# Patient Record
Sex: FEMALE | Race: WHITE | URBAN - METROPOLITAN AREA
[De-identification: names, ages, dates, MRNs, and addresses within clinical notes are randomized per-mention and may not be internally consistent; named-entity substitution may affect disease eponyms.]

---

## 2023-12-04 ENCOUNTER — LAB (OUTPATIENT)
Age: 53
End: 2023-12-04
Payer: COMMERCIAL

## 2023-12-04 DIAGNOSIS — Z00.00 ROUTINE GENERAL MEDICAL EXAMINATION AT A HEALTH CARE FACILITY: ICD-10-CM

## 2023-12-04 PROCEDURE — 86480 TB TEST CELL IMMUN MEASURE: CPT | Performed by: PHYSICIAN ASSISTANT

## 2023-12-05 LAB
GAMMA INTERFERON BACKGROUND BLD IA-ACNC: 0.02 IU/ML
M TB IFN-G BLD-IMP: NEGATIVE
M TB IFN-G CD4+ BCKGRND COR BLD-ACNC: 0.21 IU/ML
M TB IFN-G CD4+ BCKGRND COR BLD-ACNC: 0.31 IU/ML
MITOGEN IGNF BCKGRD COR BLD-ACNC: 9.98 IU/ML

## 2024-01-04 ENCOUNTER — HOSPITAL ENCOUNTER (INPATIENT)
Facility: HOSPITAL | Age: 54
LOS: 18 days | Discharge: HOME | DRG: 750 | End: 2024-01-22
Attending: EMERGENCY MEDICINE | Admitting: PSYCHIATRY & NEUROLOGY
Payer: COMMERCIAL

## 2024-01-04 DIAGNOSIS — Z00.8 MEDICAL CLEARANCE FOR PSYCHIATRIC ADMISSION: ICD-10-CM

## 2024-01-04 DIAGNOSIS — F32.3 PSYCHOTIC DEPRESSION (HCC): Primary | ICD-10-CM

## 2024-01-04 DIAGNOSIS — T78.40XA ALLERGIES: ICD-10-CM

## 2024-01-04 LAB
ALBUMIN SERPL BCP-MCNC: 4.2 G/DL (ref 3.5–5)
ALP SERPL-CCNC: 67 U/L (ref 34–104)
ALT SERPL W P-5'-P-CCNC: 14 U/L (ref 7–52)
AMPHETAMINES SERPL QL SCN: NEGATIVE
ANION GAP SERPL CALCULATED.3IONS-SCNC: 7 MMOL/L
AST SERPL W P-5'-P-CCNC: 22 U/L (ref 13–39)
ATRIAL RATE: 88 BPM
ATRIAL RATE: 88 BPM
BACTERIA UR QL AUTO: NORMAL /HPF
BARBITURATES UR QL: NEGATIVE
BASOPHILS # BLD AUTO: 0.02 THOUSANDS/ÂΜL (ref 0–0.1)
BASOPHILS NFR BLD AUTO: 0 % (ref 0–1)
BENZODIAZ UR QL: NEGATIVE
BILIRUB SERPL-MCNC: 0.35 MG/DL (ref 0.2–1)
BILIRUB UR QL STRIP: NEGATIVE
BUN SERPL-MCNC: 11 MG/DL (ref 5–25)
CALCIUM SERPL-MCNC: 9.5 MG/DL (ref 8.4–10.2)
CHLORIDE SERPL-SCNC: 104 MMOL/L (ref 96–108)
CLARITY UR: ABNORMAL
CO2 SERPL-SCNC: 27 MMOL/L (ref 21–32)
COCAINE UR QL: NEGATIVE
COLOR UR: ABNORMAL
CREAT SERPL-MCNC: 0.74 MG/DL (ref 0.6–1.3)
EOSINOPHIL # BLD AUTO: 0.01 THOUSAND/ÂΜL (ref 0–0.61)
EOSINOPHIL NFR BLD AUTO: 0 % (ref 0–6)
ERYTHROCYTE [DISTWIDTH] IN BLOOD BY AUTOMATED COUNT: 14.3 % (ref 11.6–15.1)
ETHANOL EXG-MCNC: 0 MG/DL
GFR SERPL CREATININE-BSD FRML MDRD: 93 ML/MIN/1.73SQ M
GLUCOSE SERPL-MCNC: 98 MG/DL (ref 65–140)
GLUCOSE UR STRIP-MCNC: NEGATIVE MG/DL
HCT VFR BLD AUTO: 46.8 % (ref 34.8–46.1)
HGB BLD-MCNC: 14.8 G/DL (ref 11.5–15.4)
HGB UR QL STRIP.AUTO: NEGATIVE
IMM GRANULOCYTES # BLD AUTO: 0.02 THOUSAND/UL (ref 0–0.2)
IMM GRANULOCYTES NFR BLD AUTO: 0 % (ref 0–2)
KETONES UR STRIP-MCNC: NEGATIVE MG/DL
LEUKOCYTE ESTERASE UR QL STRIP: 25
LYMPHOCYTES # BLD AUTO: 0.92 THOUSANDS/ÂΜL (ref 0.6–4.47)
LYMPHOCYTES NFR BLD AUTO: 11 % (ref 14–44)
MCH RBC QN AUTO: 27.4 PG (ref 26.8–34.3)
MCHC RBC AUTO-ENTMCNC: 31.6 G/DL (ref 31.4–37.4)
MCV RBC AUTO: 87 FL (ref 82–98)
METHADONE UR QL: NEGATIVE
MONOCYTES # BLD AUTO: 0.52 THOUSAND/ÂΜL (ref 0.17–1.22)
MONOCYTES NFR BLD AUTO: 6 % (ref 4–12)
NEUTROPHILS # BLD AUTO: 6.83 THOUSANDS/ÂΜL (ref 1.85–7.62)
NEUTS SEG NFR BLD AUTO: 83 % (ref 43–75)
NITRITE UR QL STRIP: NEGATIVE
NON-SQ EPI CELLS URNS QL MICRO: NORMAL /HPF
NRBC BLD AUTO-RTO: 0 /100 WBCS
OPIATES UR QL SCN: POSITIVE
OXYCODONE+OXYMORPHONE UR QL SCN: NEGATIVE
P AXIS: 60 DEGREES
P AXIS: 61 DEGREES
PCP UR QL: NEGATIVE
PH UR STRIP.AUTO: 6 [PH]
PLATELET # BLD AUTO: 265 THOUSANDS/UL (ref 149–390)
PMV BLD AUTO: 9.9 FL (ref 8.9–12.7)
POTASSIUM SERPL-SCNC: 3.8 MMOL/L (ref 3.5–5.3)
PR INTERVAL: 136 MS
PR INTERVAL: 136 MS
PROT SERPL-MCNC: 7.2 G/DL (ref 6.4–8.4)
PROT UR STRIP-MCNC: ABNORMAL MG/DL
QRS AXIS: 33 DEGREES
QRS AXIS: 37 DEGREES
QRSD INTERVAL: 76 MS
QRSD INTERVAL: 76 MS
QT INTERVAL: 370 MS
QT INTERVAL: 378 MS
QTC INTERVAL: 447 MS
QTC INTERVAL: 457 MS
RBC # BLD AUTO: 5.4 MILLION/UL (ref 3.81–5.12)
RBC #/AREA URNS AUTO: NORMAL /HPF
SODIUM SERPL-SCNC: 138 MMOL/L (ref 135–147)
SP GR UR STRIP.AUTO: 1.02 (ref 1–1.04)
T WAVE AXIS: -2 DEGREES
T WAVE AXIS: 7 DEGREES
THC UR QL: NEGATIVE
TSH SERPL DL<=0.05 MIU/L-ACNC: 5.93 UIU/ML (ref 0.45–4.5)
UROBILINOGEN UA: NEGATIVE MG/DL
VENTRICULAR RATE: 88 BPM
VENTRICULAR RATE: 88 BPM
WBC # BLD AUTO: 8.32 THOUSAND/UL (ref 4.31–10.16)
WBC #/AREA URNS AUTO: NORMAL /HPF

## 2024-01-04 PROCEDURE — 80053 COMPREHEN METABOLIC PANEL: CPT | Performed by: EMERGENCY MEDICINE

## 2024-01-04 PROCEDURE — 80307 DRUG TEST PRSMV CHEM ANLYZR: CPT | Performed by: EMERGENCY MEDICINE

## 2024-01-04 PROCEDURE — 85025 COMPLETE CBC W/AUTO DIFF WBC: CPT | Performed by: EMERGENCY MEDICINE

## 2024-01-04 PROCEDURE — 99285 EMERGENCY DEPT VISIT HI MDM: CPT | Performed by: EMERGENCY MEDICINE

## 2024-01-04 PROCEDURE — 81001 URINALYSIS AUTO W/SCOPE: CPT | Performed by: EMERGENCY MEDICINE

## 2024-01-04 PROCEDURE — 82075 ASSAY OF BREATH ETHANOL: CPT | Performed by: EMERGENCY MEDICINE

## 2024-01-04 PROCEDURE — 36415 COLL VENOUS BLD VENIPUNCTURE: CPT | Performed by: EMERGENCY MEDICINE

## 2024-01-04 PROCEDURE — 96372 THER/PROPH/DIAG INJ SC/IM: CPT

## 2024-01-04 PROCEDURE — 84443 ASSAY THYROID STIM HORMONE: CPT | Performed by: EMERGENCY MEDICINE

## 2024-01-04 PROCEDURE — 93005 ELECTROCARDIOGRAM TRACING: CPT

## 2024-01-04 PROCEDURE — 99283 EMERGENCY DEPT VISIT LOW MDM: CPT

## 2024-01-04 RX ORDER — RISPERIDONE 1 MG/1
1 TABLET ORAL
Status: DISCONTINUED | OUTPATIENT
Start: 2024-01-04 | End: 2024-01-06

## 2024-01-04 RX ORDER — LORAZEPAM 2 MG/ML
2 INJECTION INTRAMUSCULAR ONCE
Status: COMPLETED | OUTPATIENT
Start: 2024-01-04 | End: 2024-01-04

## 2024-01-04 RX ORDER — HALOPERIDOL 5 MG/ML
5 INJECTION INTRAMUSCULAR ONCE
Status: COMPLETED | OUTPATIENT
Start: 2024-01-04 | End: 2024-01-04

## 2024-01-04 RX ORDER — HYDROXYZINE HYDROCHLORIDE 25 MG/1
25 TABLET, FILM COATED ORAL
Status: DISCONTINUED | OUTPATIENT
Start: 2024-01-04 | End: 2024-01-22 | Stop reason: HOSPADM

## 2024-01-04 RX ORDER — RISPERIDONE 2 MG/1
2 TABLET ORAL DAILY
Status: DISCONTINUED | OUTPATIENT
Start: 2024-01-04 | End: 2024-01-04

## 2024-01-04 RX ORDER — MAGNESIUM HYDROXIDE/ALUMINUM HYDROXICE/SIMETHICONE 120; 1200; 1200 MG/30ML; MG/30ML; MG/30ML
30 SUSPENSION ORAL EVERY 4 HOURS PRN
Status: DISCONTINUED | OUTPATIENT
Start: 2024-01-04 | End: 2024-01-22 | Stop reason: HOSPADM

## 2024-01-04 RX ORDER — HALOPERIDOL 5 MG/ML
5 INJECTION INTRAMUSCULAR
Status: DISCONTINUED | OUTPATIENT
Start: 2024-01-04 | End: 2024-01-06

## 2024-01-04 RX ORDER — LORAZEPAM 2 MG/ML
1 INJECTION INTRAMUSCULAR
Status: DISCONTINUED | OUTPATIENT
Start: 2024-01-04 | End: 2024-01-22 | Stop reason: HOSPADM

## 2024-01-04 RX ORDER — RISPERIDONE 0.5 MG/1
0.5 TABLET ORAL
Status: DISCONTINUED | OUTPATIENT
Start: 2024-01-04 | End: 2024-01-06

## 2024-01-04 RX ORDER — LANOLIN ALCOHOL/MO/W.PET/CERES
3 CREAM (GRAM) TOPICAL
Status: DISCONTINUED | OUTPATIENT
Start: 2024-01-04 | End: 2024-01-10

## 2024-01-04 RX ORDER — RISPERIDONE 0.25 MG/1
0.25 TABLET ORAL
Status: DISCONTINUED | OUTPATIENT
Start: 2024-01-04 | End: 2024-01-06

## 2024-01-04 RX ADMIN — LORAZEPAM 2 MG: 2 INJECTION INTRAMUSCULAR; INTRAVENOUS at 17:27

## 2024-01-04 RX ADMIN — LORAZEPAM 2 MG: 2 INJECTION INTRAMUSCULAR; INTRAVENOUS at 10:02

## 2024-01-04 RX ADMIN — HALOPERIDOL LACTATE 5 MG: 5 INJECTION, SOLUTION INTRAMUSCULAR at 10:01

## 2024-01-04 NOTE — ED NOTES
Pt refused risperdal multiple times despite education. Pt states that she was told to stop taking medication. Provider aware.      Yelitza Lyles RN  01/04/24 1125

## 2024-01-04 NOTE — ED NOTES
Patient's son Mega contacted the ED. His phone number is [phone]. Patient is currently medicated and sleeping. He is currently not identified as an emergency contact. Once awake she may wish to contact him.

## 2024-01-04 NOTE — ED NOTES
"The patient is a 52 y/o Austrian-speaking F who arrived in handcuffs with [employer] [employer] Officers Marisela and Reyes. According to EMR, the patient has a history of schizoaffective disorder, depressed type. Officers stated they were called to [employer]. They arrived to find the patient confused and disoriented. They reported she was there to  her sister's children, but her sister currently resides in [place] with her children. Patient is a poor historian. She repeatedly stated, \"I'm fine. I'm fine.\" \"I have to go now.\" She did not seem to understand why she was brought to the ED. She appeared fearful and paranoid, at one point placing herself behind a litter and in a corner. She was resistant to answering questions and, initially agreed to stay, but then got out her car keys, zipped her purse and coat and was about to exit the room. Staff attempted to have her remove her coat and to sit on the litter. Although she was never violent in any way, a struggle ensued due to the patient stiffening her arms and legs. She did require medication and restraints due to elopement risk and inability to cooperate with behavioral health safety protocol. At times, the patient stares blankly when asked questions. She occasionally gave brief responses. She denied being prescribed medication of any kind. EMR indicated that she was hospitalized at [facility] in September 2023 for psychosis. She had been prescribed Risperdal 2 mg BID, and it appears she had tried to get refills, but, per her request, her PCP discontinued it on 12/8, with instructions to discuss with her psychiatrist, Raudel Vu MD, and to restart if auditory hallucinations returned. Per EMR, the patient has had extensive psychiatric hospitalizations. She does seem to have had periods in which she actively sought refills for her medication; however, at this time she does not seem to recognize the need for " medication or treatment. Patient declined to sign in voluntarily and, in fact, does not seem to understand why she is here. ED Crisis initiated a 302 through [facility] Crisis Intervention, Ivone Ray. It was supported by Mitchel Flowers MD, the examining physician. The document was sent to [email or web address]. It was received and reviewed by Ivelisse Jones.

## 2024-01-04 NOTE — RESTRAINT FACE TO FACE
Restraint Face to Face   Olga Byrd 53 y.o. female MRN: [id number]  Unit/Bed#: ED 11 Encounter: [id number]      Physical Evaluation attempting to leave ed  Purpose for Restraints/ Seclusion High risk for self harm  Patient's reaction to the intervention cooperative  Patient's medical conditionstable  Patient's Behavioral condition unstable  Restraints to be Continued

## 2024-01-04 NOTE — ED PROVIDER NOTES
History  Chief Complaint   Patient presents with    Psychiatric Evaluation     Pt arrives in handcuffs via [employer] officers, found near school confused and disoriented, trying to  her sisters kids (sister lives in [place]).  Pt arrives to ED disoriented to place and time, uncooperative with care     53-year-old female presenting the emergency department with handcuffs via [employer] officer.  Patient was found near school confused disoriented trying to  sister's case.  But sister lives in [place].  Patient lives in the area, notes that stop taking antipsychotic on instructions of her PCP.  Denies hallucinations.  Staring into space.        None       History reviewed. No pertinent past medical history.    History reviewed. No pertinent surgical history.    History reviewed. No pertinent family history.  I have reviewed and agree with the history as documented.    E-Cigarette/Vaping     E-Cigarette/Vaping Substances     Social History     Tobacco Use    Smoking status: Unknown       Review of Systems   Constitutional:  Negative for chills and fever.   HENT:  Negative for ear pain and sore throat.    Eyes:  Negative for pain and visual disturbance.   Respiratory:  Negative for cough and shortness of breath.    Cardiovascular:  Negative for chest pain and palpitations.   Gastrointestinal:  Negative for abdominal pain and vomiting.   Genitourinary:  Negative for dysuria and hematuria.   Musculoskeletal:  Negative for arthralgias and back pain.   Skin:  Negative for color change and rash.   Neurological:  Negative for seizures and syncope.   All other systems reviewed and are negative.      Physical Exam  Physical Exam  Vitals and nursing note reviewed.   Constitutional:       General: She is not in acute distress.     Appearance: She is well-developed.   HENT:      Head: Normocephalic and atraumatic.      Nose: Nose normal.      Mouth/Throat:      Mouth: Mucous membranes are moist.   Eyes:      Conjunctiva/sclera:  Conjunctivae normal.   Cardiovascular:      Rate and Rhythm: Normal rate and regular rhythm.      Heart sounds: No murmur heard.  Pulmonary:      Effort: Pulmonary effort is normal. No respiratory distress.      Breath sounds: Normal breath sounds.   Abdominal:      Palpations: Abdomen is soft.      Tenderness: There is no abdominal tenderness.   Musculoskeletal:         General: No swelling.      Cervical back: Neck supple.   Skin:     General: Skin is warm and dry.      Capillary Refill: Capillary refill takes less than 2 seconds.   Neurological:      Mental Status: She is alert.   Psychiatric:         Mood and Affect: Mood normal.         Vital Signs  ED Triage Vitals [01/04/24 1056]   Temperature Pulse Respirations Blood Pressure SpO2   (!) 97 °F (36.1 °C) 81 18 155/96 95 %      Temp Source Heart Rate Source Patient Position - Orthostatic VS BP Location FiO2 (%)   Tympanic Monitor Lying Right arm --      Pain Score       No Pain           Vitals:    01/04/24 1056   BP: 155/96   Pulse: 81   Patient Position - Orthostatic VS: Lying         Visual Acuity      ED Medications  Medications   risperiDONE (RisperDAL) tablet 2 mg (2 mg Oral Not Given 1/4/24 1122)   haloperidol lactate (HALDOL) injection 5 mg (5 mg Intramuscular Given 1/4/24 1001)   LORazepam (ATIVAN) injection 2 mg (2 mg Intramuscular Given 1/4/24 1002)       Diagnostic Studies  Results Reviewed       Procedure Component Value Units Date/Time    TSH [[id number]]  (Abnormal) Collected: 01/04/24 1141    Lab Status: Final result Specimen: Blood from Arm, Right Updated: 01/04/24 1248     TSH 3RD GENERATON 5.929 uIU/mL     Comprehensive metabolic panel [[id number]] Collected: 01/04/24 1141    Lab Status: Final result Specimen: Blood from Arm, Right Updated: 01/04/24 1232     Sodium 138 mmol/L      Potassium 3.8 mmol/L      Chloride 104 mmol/L      CO2 27 mmol/L      ANION GAP 7 mmol/L      BUN 11 mg/dL      Creatinine 0.74 mg/dL      Glucose 98 mg/dL       Calcium 9.5 mg/dL      AST 22 U/L      ALT 14 U/L      Alkaline Phosphatase 67 U/L      Total Protein 7.2 g/dL      Albumin 4.2 g/dL      Total Bilirubin 0.35 mg/dL      eGFR 93 ml/min/1.73sq m     Narrative:      National Kidney Disease Foundation guidelines for Chronic Kidney Disease (CKD):     Stage 1 with normal or high GFR (GFR > 90 mL/min/1.73 square meters)    Stage 2 Mild CKD (GFR = 60-89 mL/min/1.73 square meters)    Stage 3A Moderate CKD (GFR = 45-59 mL/min/1.73 square meters)    Stage 3B Moderate CKD (GFR = 30-44 mL/min/1.73 square meters)    Stage 4 Severe CKD (GFR = 15-29 mL/min/1.73 square meters)    Stage 5 End Stage CKD (GFR <15 mL/min/1.73 square meters)  Note: GFR calculation is accurate only with a steady state creatinine    CBC and differential [[id number]]  (Abnormal) Collected: 01/04/24 1141    Lab Status: Final result Specimen: Blood from Arm, Right Updated: 01/04/24 1153     WBC 8.32 Thousand/uL      RBC 5.40 Million/uL      Hemoglobin 14.8 g/dL      Hematocrit 46.8 %      MCV 87 fL      MCH 27.4 pg      MCHC 31.6 g/dL      RDW 14.3 %      MPV 9.9 fL      Platelets 265 Thousands/uL      nRBC 0 /100 WBCs      Neutrophils Relative 83 %      Immat GRANS % 0 %      Lymphocytes Relative 11 %      Monocytes Relative 6 %      Eosinophils Relative 0 %      Basophils Relative 0 %      Neutrophils Absolute 6.83 Thousands/µL      Immature Grans Absolute 0.02 Thousand/uL      Lymphocytes Absolute 0.92 Thousands/µL      Monocytes Absolute 0.52 Thousand/µL      Eosinophils Absolute 0.01 Thousand/µL      Basophils Absolute 0.02 Thousands/µL     UA (URINE) with reflex to Scope [[id number]]     Lab Status: No result Specimen: Urine     Rapid drug screen, urine [[id number]]     Lab Status: No result Specimen: Urine     POCT alcohol breath test [[id number]]     Lab Status: No result                    No orders to display              Procedures  Procedures         ED Course                               SBIRT  20yo+      Flowsheet Row Most Recent Value   Initial Alcohol Screen: US AUDIT-C     1. How often do you have a drink containing alcohol? 0 Filed at: 01/04/2024 1144   2. How many drinks containing alcohol do you have on a typical day you are drinking?  0 Filed at: 01/04/2024 1144   3a. Male UNDER 65: How often do you have five or more drinks on one occasion? 0 Filed at: 01/04/2024 1144   3b. FEMALE Any Age, or MALE 65+: How often do you have 4 or more drinks on one occassion? 0 Filed at: 01/04/2024 1144   Audit-C Score 0 Filed at: 01/04/2024 1144   REJI: How many times in the past year have you...    Used an illegal drug or used a prescription medication for non-medical reasons? Never Filed at: 01/04/2024 1144                      Medical Decision Making  53-year-old female presenting emerged department with confusion, unable to explain her actions from earlier today.  302 petition by crisis worker, upheld.  Awaiting psychiatric evaluation and placement    Amount and/or Complexity of Data Reviewed  Labs: ordered.    Risk  Prescription drug management.  Decision regarding hospitalization.             Disposition  Final diagnoses:   Psychotic depression (HCC)     Time reflects when diagnosis was documented in both MDM as applicable and the Disposition within this note       Time User Action Codes Description Comment    1/4/2024  9:53 AM Mitchel Flowers Add [F32.3] Psychotic depression (HCC)           ED Disposition       ED Disposition   Transfer to Behavioral Health Condition   --    Date/Time   Thu Jan 4, 2024 1131    Comment   Olga Byrd should be transferred out to D and has been medically cleared.               Follow-up Information    None         Patient's Medications    No medications on file       No discharge procedures on file.    PDMP Review       None            ED Provider  Electronically Signed by             Mitchel Flowers MD  01/04/24 7479

## 2024-01-04 NOTE — ED NOTES
Remaining 2 restraints removed. Pt verbalized an understanding of acceptable behavior to remain out of restraints.      Yelitza Lyles RN  01/04/24 5561

## 2024-01-04 NOTE — ED NOTES
Pt sleeping with no s/s of distress observed. Continues on 1:1 for safety.      Yelitza Lyles RN  01/04/24 0443

## 2024-01-04 NOTE — ED NOTES
EKG attempted, pt became upset and requesting to leave. Uncooperative with redirection, security and additional staff at bedside.      Yelitza Lyles RN  01/04/24 8792

## 2024-01-04 NOTE — QUICK NOTE
This writer and resident physicians attempted to evaluate patient this p.m., however, patient appeared increasingly sedated secondary to use of p.r.n. medications, in contralateral restraints. Psychiatry to evaluate in the a.m.    Jordan Christopher Holter, DO

## 2024-01-05 PROBLEM — Z00.8 MEDICAL CLEARANCE FOR PSYCHIATRIC ADMISSION: Status: ACTIVE | Noted: 2024-01-05

## 2024-01-05 PROBLEM — R76.12 POSITIVE QUANTIFERON-TB GOLD TEST: Status: ACTIVE | Noted: 2024-01-05

## 2024-01-05 PROBLEM — F25.9 SCHIZOAFFECTIVE DISORDER (HCC): Status: ACTIVE | Noted: 2024-01-05

## 2024-01-05 LAB
25(OH)D3 SERPL-MCNC: 40.5 NG/ML (ref 30–100)
ANION GAP SERPL CALCULATED.3IONS-SCNC: 11 MMOL/L
BASOPHILS # BLD AUTO: 0.04 THOUSANDS/ÂΜL (ref 0–0.1)
BASOPHILS NFR BLD AUTO: 1 % (ref 0–1)
BUN SERPL-MCNC: 13 MG/DL (ref 5–25)
CALCIUM SERPL-MCNC: 9.7 MG/DL (ref 8.4–10.2)
CHLORIDE SERPL-SCNC: 101 MMOL/L (ref 96–108)
CHOLEST SERPL-MCNC: 198 MG/DL
CO2 SERPL-SCNC: 28 MMOL/L (ref 21–32)
CREAT SERPL-MCNC: 0.89 MG/DL (ref 0.6–1.3)
EOSINOPHIL # BLD AUTO: 0.05 THOUSAND/ÂΜL (ref 0–0.61)
EOSINOPHIL NFR BLD AUTO: 1 % (ref 0–6)
ERYTHROCYTE [DISTWIDTH] IN BLOOD BY AUTOMATED COUNT: 14.3 % (ref 11.6–15.1)
FOLATE SERPL-MCNC: >22.3 NG/ML
GFR SERPL CREATININE-BSD FRML MDRD: 74 ML/MIN/1.73SQ M
GLUCOSE P FAST SERPL-MCNC: 89 MG/DL (ref 65–99)
GLUCOSE SERPL-MCNC: 89 MG/DL (ref 65–140)
HCT VFR BLD AUTO: 48.8 % (ref 34.8–46.1)
HDLC SERPL-MCNC: 81 MG/DL
HGB BLD-MCNC: 15.7 G/DL (ref 11.5–15.4)
IMM GRANULOCYTES # BLD AUTO: 0.02 THOUSAND/UL (ref 0–0.2)
IMM GRANULOCYTES NFR BLD AUTO: 0 % (ref 0–2)
LDLC SERPL CALC-MCNC: 108 MG/DL (ref 0–100)
LYMPHOCYTES # BLD AUTO: 1.87 THOUSANDS/ÂΜL (ref 0.6–4.47)
LYMPHOCYTES NFR BLD AUTO: 24 % (ref 14–44)
MCH RBC QN AUTO: 28.1 PG (ref 26.8–34.3)
MCHC RBC AUTO-ENTMCNC: 32.2 G/DL (ref 31.4–37.4)
MCV RBC AUTO: 87 FL (ref 82–98)
MONOCYTES # BLD AUTO: 0.5 THOUSAND/ÂΜL (ref 0.17–1.22)
MONOCYTES NFR BLD AUTO: 6 % (ref 4–12)
NEUTROPHILS # BLD AUTO: 5.47 THOUSANDS/ÂΜL (ref 1.85–7.62)
NEUTS SEG NFR BLD AUTO: 68 % (ref 43–75)
NONHDLC SERPL-MCNC: 117 MG/DL
NRBC BLD AUTO-RTO: 0 /100 WBCS
PLATELET # BLD AUTO: 288 THOUSANDS/UL (ref 149–390)
PMV BLD AUTO: 10.3 FL (ref 8.9–12.7)
POTASSIUM SERPL-SCNC: 3.5 MMOL/L (ref 3.5–5.3)
RBC # BLD AUTO: 5.59 MILLION/UL (ref 3.81–5.12)
SODIUM SERPL-SCNC: 140 MMOL/L (ref 135–147)
TRIGL SERPL-MCNC: 47 MG/DL
VIT B12 SERPL-MCNC: 723 PG/ML (ref 180–914)
WBC # BLD AUTO: 7.95 THOUSAND/UL (ref 4.31–10.16)

## 2024-01-05 PROCEDURE — 82746 ASSAY OF FOLIC ACID SERUM: CPT | Performed by: PSYCHIATRY & NEUROLOGY

## 2024-01-05 PROCEDURE — 80048 BASIC METABOLIC PNL TOTAL CA: CPT | Performed by: PSYCHIATRY & NEUROLOGY

## 2024-01-05 PROCEDURE — 99223 1ST HOSP IP/OBS HIGH 75: CPT | Performed by: PSYCHIATRY & NEUROLOGY

## 2024-01-05 PROCEDURE — 82306 VITAMIN D 25 HYDROXY: CPT | Performed by: PSYCHIATRY & NEUROLOGY

## 2024-01-05 PROCEDURE — 80061 LIPID PANEL: CPT | Performed by: PSYCHIATRY & NEUROLOGY

## 2024-01-05 PROCEDURE — 82607 VITAMIN B-12: CPT | Performed by: PSYCHIATRY & NEUROLOGY

## 2024-01-05 PROCEDURE — 85025 COMPLETE CBC W/AUTO DIFF WBC: CPT | Performed by: PSYCHIATRY & NEUROLOGY

## 2024-01-05 RX ORDER — RISPERIDONE 1 MG/1
1 TABLET, ORALLY DISINTEGRATING ORAL 2 TIMES DAILY
Status: DISCONTINUED | OUTPATIENT
Start: 2024-01-05 | End: 2024-01-10

## 2024-01-05 RX ADMIN — LORAZEPAM 1 MG: 2 INJECTION INTRAMUSCULAR; INTRAVENOUS at 09:09

## 2024-01-05 RX ADMIN — HALOPERIDOL LACTATE 5 MG: 5 INJECTION, SOLUTION INTRAMUSCULAR at 09:09

## 2024-01-05 NOTE — PLAN OF CARE
Problem: Alteration in Thoughts and Perception  Goal: Treatment Goal: Gain control of psychotic behaviors/thinking, reduce/eliminate presenting symptoms and demonstrate improved reality functioning upon discharge  Outcome: Not Progressing  Goal: Verbalize thoughts and feelings  Description: Interventions:  - Promote a nonjudgmental and trusting relationship with the patient through active listening and therapeutic communication  - Assess patient's level of functioning, behavior and potential for risk  - Engage patient in 1 on 1 interactions  - Encourage patient to express fears, feelings, frustrations, and discuss symptoms    - [place] patient to reality, help patient recognize reality-based thinking   - Administer medications as ordered and assess for potential side effects  - Provide the patient education related to the signs and symptoms of the illness and desired effects of prescribed medications  Outcome: Not Progressing  Goal: Refrain from acting on delusional thinking/internal stimuli  Description: Interventions:  - Monitor patient closely, per order   - Utilize least restrictive measures   - Set reasonable limits, give positive feedback for acceptable   - Administer medications as ordered and monitor of potential side effects  Outcome: Not Progressing  Goal: Agree to be compliant with medication regime, as prescribed and report medication side effects  Description: Interventions:  - Offer appropriate PRN medication and supervise ingestion; conduct AIMS, as needed   Outcome: Not Progressing  Goal: Attend and participate in unit activities, including therapeutic, recreational, and educational groups  Description: Interventions:  -Encourage Visitation and family involvement in care  Outcome: Not Progressing  Goal: Recognize dysfunctional thoughts, communicate reality-based thoughts at the time of discharge  Description: Interventions:  - Provide medication and psycho-education to assist patient in compliance  and developing insight into his/her illness   Outcome: Not Progressing  Goal: Complete daily ADLs, including personal hygiene independently, as able  Description: Interventions:  - Observe, teach, and assist patient with ADLS  - Monitor and promote a balance of rest/activity, with adequate nutrition and elimination   Outcome: Not Progressing     Problem: Ineffective Coping  Goal: Cooperates with admission process  Description: Interventions:   - Complete admission process  Outcome: Not Progressing  Goal: Identifies ineffective coping skills  Outcome: Not Progressing  Goal: Identifies healthy coping skills  Outcome: Not Progressing  Goal: Demonstrates healthy coping skills  Outcome: Not Progressing  Goal: Participates in unit activities  Description: Interventions:  - Provide therapeutic environment   - Provide required programming   - Redirect inappropriate behaviors   Outcome: Not Progressing  Goal: Patient/Family participate in treatment and DC plans  Description: Interventions:  - Provide therapeutic environment  Outcome: Not Progressing  Goal: Patient/Family verbalizes awareness of resources  Outcome: Not Progressing  Goal: Understands least restrictive measures  Description: Interventions:  - Utilize least restrictive behavior  Outcome: Not Progressing  Goal: Free from restraint events  Description: - Utilize least restrictive measures   - Provide behavioral interventions   - Redirect inappropriate behaviors   Outcome: Not Progressing     Problem: Depression  Goal: Treatment Goal: Demonstrate behavioral control of depressive symptoms, verbalize feelings of improved mood/affect, and adopt new coping skills prior to discharge  Outcome: Not Progressing  Goal: Verbalize thoughts and feelings  Description: Interventions:  - Assess and re-assess patient's level of risk   - Engage patient in 1:1 interactions, daily, for a minimum of 15 minutes   - Encourage patient to express feelings, fears, frustrations, hopes    Outcome: Not Progressing  Goal: Refrain from harming self  Description: Interventions:  - Monitor patient closely, per order   - Supervise medication ingestion, monitor effects and side effects   Outcome: Not Progressing  Goal: Refrain from isolation  Description: Interventions:  - Develop a trusting relationship   - Encourage socialization   Outcome: Not Progressing  Goal: Refrain from self-neglect  Outcome: Not Progressing  Goal: Attend and participate in unit activities, including therapeutic, recreational, and educational groups  Description: Interventions:  - Provide therapeutic and educational activities daily, encourage attendance and participation, and document same in the medical record   Outcome: Not Progressing  Goal: Complete daily ADLs, including personal hygiene independently, as able  Description: Interventions:  - Observe, teach, and assist patient with ADLS  -  Monitor and promote a balance of rest/activity, with adequate nutrition and elimination   Outcome: Not Progressing     Problem: Anxiety  Goal: Anxiety is at manageable level  Description: Interventions:  - Assess and monitor patient's anxiety level.   - Monitor for signs and symptoms (heart palpitations, chest pain, shortness of breath, headaches, nausea, feeling jumpy, restlessness, irritable, apprehensive).   - Collaborate with interdisciplinary team and initiate plan and interventions as ordered.  - [place] patient to unit/surroundings  - Explain treatment plan  - Encourage participation in care  - Encourage verbalization of concerns/fears  - Identify coping mechanisms  - Assist in developing anxiety-reducing skills  - Administer/offer alternative therapies  - Limit or eliminate stimulants  Outcome: Not Progressing     Problem: Alteration in Orientation  Goal: Treatment Goal: Demonstrate a reduction of confusion and improved orientation to person, place, time and/or situation upon discharge, according to optimum baseline/ability  Outcome:  Not Progressing  Goal: Interact with staff daily  Description: Interventions:  - Assess and re-assess patient's level of orientation  - Engage patient in 1 on 1 interactions, daily, for a minimum of 15 minutes   - Establish rapport/trust with patient   Outcome: Not Progressing  Goal: Express concerns related to confused thinking related to:  Description: Interventions:  - Encourage patient to express feelings, fears, frustrations, hopes  - Assign consistent caregivers   - [place]/re-orient patient as needed  - Allow comfort items, as appropriate  - Provide visual cues, signs, etc.   Outcome: Not Progressing  Goal: Allow medical examinations, as recommended  Description: Interventions:  - Provide physical/neurological exams and/or referrals, per provider   Outcome: Not Progressing  Goal: Cooperate with recommended testing/procedures  Description: Interventions:  - Determine need for ancillary testing  - Observe for mental status changes  - Implement falls/precaution protocol   Outcome: Not Progressing  Goal: Attend and participate in unit activities, including therapeutic, recreational, and educational groups  Description: Interventions:  - Provide therapeutic and educational activities daily, encourage attendance and participation, and document same in the medical record   - Provide appropriate opportunities for reminiscence   - Provide a consistent daily routine   - Encourage family contact/visitation   Outcome: Not Progressing  Goal: Complete daily ADLs, including personal hygiene independently, as able  Description: Interventions:  - Observe, teach, and assist patient with ADLS  Outcome: Not Progressing

## 2024-01-05 NOTE — DISCHARGE INSTR - OTHER ORDERS
You are being discharged to: [address], [address], [place] [address]     If you are unable to deal with your distressed mood alone please contact [facility] Crisis # [phone], dial 911 or go to the nearest emergency center.     If you are experiencing a mental health emergency, you may call the [facility] Crisis Intervention Office 24 hours a day, 7 days per week at [phone].     In [facility], call [phone].     HOW TO GET SUBSTANCE ABUSE HELP:  If you or someone you know has a drug or alcohol problem, there is help:  [facility] Drug & Alcohol Abuse Services: [phone]  [facility] Alcohol Abuse [facility]: [phone]  An assessment is the first step.   In addition to those listed there are other programs available in the area but assessment is best to determine an appropriate level of care.  If you DO NOT have Medical Assistance (MA) or Private Insurance, an assessment can be scheduled at one of these providers:  [facility]  [address] [place] [address]  [phone]   [facility]  [address] [place] [address]  [phone]   [facility]  [address] [place] [address] [place] [address]  [phone]   [facility]  [address] [place] [address]  [phone]   [facility]  [address] [place] [address]  [phone]   [facility]  [address] [place] [address]  [phone]   [facility]  [address], [place] [address]  [phone]     If you HAVE Medical Assistance, an assessment can be scheduled at one of these providers:  [place] [facility]  [address] [facility] [place] [address]  [phone]   [facility]  [address] [place] [address]  [phone]   [facility]  [address], [place] [address]  [phone]  [address] [place] [address], [address], [place] [address]  [phone]   [address] [place] [address]  [phone]   [address] [place] [address]  [phone]   [facility])  [address] [place] [address]  [phone]   [facility]  [address] [place] [address]  [phone]   [facility]  [address] [place] [address]  [phone]   [facility]  [address] [place] [address]  [phone]   [facility]  [address], [place] [address]  [phone]     If you HAVE Private Insurance, an assessment can be scheduled at one of these providers.  Please contact these Providers to determine if they are in your network plan:  [facility]  [address], [place] [address]  [phone]   [address] [place] [address]  [phone]   [address] [place] [address]  [phone]   [facility])  [address] [place] [address]  [phone]   [facility]  [address] [place] [address]  [phone]   [facility]  [address], [place] [address]  [phone]     When you need someone to listen, the [facility] is available for 16 hours a day, 7 days a week, from the time of 7-10am and 2pm-2am.  It is not available from the hours of 2am-6am and 10am-2pm. A representative can be reached at [phone].

## 2024-01-05 NOTE — CASE MANAGEMENT
CM attempted to meet with pt to review 303. Pt currently in restraints and asleep due to PRN medications. CM completed 303 petition and faxed to [facility] with request for hearing on Tue 1/9.

## 2024-01-05 NOTE — TREATMENT TEAM
01/05/24 1428   Team Meeting   Meeting Type Tx Team Meeting   Initial Conference Date 01/05/24   Team Members Present   Team Members Present Physician;Nurse;   Physician Team Member Dr Matthews   Nursing Team Member Jose   Care Management Team Member Soni   Patient/Family Present   Patient Present Yes   Patient's Family Present No     CM reviewed treatment plan with pt in [place]. Pt refusing to sign. Pt reports she will not take medication or go to groups.

## 2024-01-05 NOTE — H&P
Psychiatric Evaluation - Behavioral Health   Olga Byrd 53 y.o. female MRN: [id number]  [facility] Encounter: [id number]    Assessment/Plan   Principal Problem:    Schizoaffective disorder (HCC)  Active Problems:    Medical clearance for psychiatric admission    Positive QuantiFERON-TB Gold test    Plan:  1.  Patient is admitted to [address] on a 302 involuntary commitment basis for safety and stabilization.  2.  Patient is started on psychiatric medication of Risperdal 1 mg twice daily to be titrated up to clinical effect.  3.  Group, milieu and supportive therapies.  4.  Medical team to follow and support patient's medical needs.  5.  Collateral information.  6.  Discharge planning                Current Facility-Administered Medications   Medication Dose Route Frequency Provider Last Rate    aluminum-magnesium hydroxide-simethicone  30 mL Oral Q4H PRN Kary Wilson MD      haloperidol lactate  5 mg Intramuscular Q4H PRN Max 4/day Kary Wilson MD      hydrOXYzine HCL  25 mg Oral Q6H PRN Max 4/day Kary Wilson MD      LORazepam  1 mg Intramuscular Q6H PRN Max 3/day Kary Wilson MD      melatonin  3 mg Oral HS Kary Wilson MD      nicotine polacrilex  4 mg Oral Q2H PRN Kary Wilson MD      risperiDONE  0.25 mg Oral Q4H PRN Max 6/day Kary Wilson MD      risperiDONE  0.5 mg Oral Q4H PRN Max 3/day Kary Wislon MD      risperiDONE  1 mg Oral Q2H PRN Max 3/day Kary Wilson MD       Risks, benefits and possible side effects of Medications:   Risks, benefits, and possible side effects of medications explained to patient and patient verbalizes understanding.         Chief Complaint: Psychosis with paranoia and delusions    History of Present Illness     Copy from ED note written by Gabbi Wellington, crisis worker on 1/4/2024    The patient is a 54 y/o Tristanian-speaking F who arrived in handcuffs with [employer] Officers Marisela and Reyes. According to  "EMR, the patient has a history of schizoaffective disorder, depressed type. Officers stated they were called to [employer]. They arrived to find the patient confused and disoriented. They reported she was there to  her sister's children, but her sister currently resides in [place] with her children. Patient is a poor historian. She repeatedly stated, \"I'm fine. I'm fine.\" \"I have to go now.\" She did not seem to understand why she was brought to the ED. She appeared fearful and paranoid, at one point placing herself behind a litter and in a corner. She was resistant to answering questions and, initially agreed to stay, but then got out her car keys, zipped her purse and coat and was about to exit the room. Staff attempted to have her remove her coat and to sit on the litter. Although she was never violent in any way, a struggle ensued due to the patient stiffening her arms and legs. She did require medication and restraints due to elopement risk and inability to cooperate with behavioral health safety protocol. At times, the patient stares blankly when asked questions. She occasionally gave brief responses. She denied being prescribed medication of any kind. EMR indicated that she was hospitalized at [facility] in September 2023 for psychosis. She had been prescribed Risperdal 2 mg BID, and it appears she had tried to get refills, but, per her request, her PCP discontinued it on 12/8, with instructions to discuss with her psychiatrist, Raudel Vu MD, and to restart if auditory hallucinations returned. Per EMR, the patient has had extensive psychiatric hospitalizations. She does seem to have had periods in which she actively sought refills for her medication; however, at this time she does not seem to recognize the need for medication or treatment. Patient declined to sign in voluntarily and, in fact, does not seem to understand why she is here. ED Crisis " initiated a 302 through [facility], Ivone Cory. It was supported by Mitchel Flowers MD, the examining physician. The document was sent to [email or web address]. It was received and reviewed by Ivelisse Jones.          On evaluation, patient is seen with Serbian-speaking .  Patient is a poor historian.  Patient is only giving limited information as she is focused on being discharged.  Patient is guarded, paranoid and easily irritable.  Patient is focused on being discharged.  Patient feels she needs to be discharged to be home with her 15-year-old daughter as she is afraid that she will be taken away from her if she is not there.  As above the patient was brought in by [employer] police as she was found outside toward her school insisting to  sister's children although her sister lives in [place] with her children.  Patient expresses paranoia.  The patient reports being brought to the hospital by fake police.  Patient also expresses that this is a scheme for her brother and sister to get money by making the patient look like she is unable to care for her children.  The patient was admitted to psych unit in [place] in September patient also states that this was done so that her siblings can get money trying to make the patient look like she is unable to care for her children.  Apparently this episode of psychosis was triggered by the patient fasting for Religion purposes.  Patient does admit to have been on a prescription of Risperdal in the past but says she was told she did not need to take it anymore.  Patient had been in restraints in the emergency room due to attempting to leave.  On the unit, the patient has been trying to elope at least twice off the unit.  Earlier this morning the patient, the patient had been banging on windows and then forced her way into the nurses station requiring staff to remove her she became combative and was placed into  4-point restraints requiring also Haldol and Ativan.  Patient at first was refusing to take medications but later stated that she was agreeable to it.                Psychiatric Review Of Systems: Patient is a poor historian  sleep: no  appetite changes: no  weight changes: no  energy/anergy: no  interest/pleasure/anhedonia: no  somatic symptoms: no  anxiety/panic: no patient was admitted  dom: no  guilty/hopeless: no  self injurious behavior/risky behavior: no    Historical Information     Past Psychiatric History:   Inpatient Treatment: To [facility] in [facility] in September 2023 for psychosis  Outpatient Treatment: [facility]  Past Suicide Attempts: Unknown due to patient factors  Past Violent Behavior: Patient can become combative  Past Psychiatric Medication Trials: Risperdal    Substance Abuse History:  E-Cigarette/Vaping    E-Cigarette Use Never User       E-Cigarette/Vaping Substances    Nicotine No     THC No     CBD No     Flavoring No     Other No     Unknown No        Social History       Tobacco History       Smoking Status  Never      Smokeless Tobacco Use  Never              Alcohol History       Alcohol Use Status  Yes              Drug Use       Drug Use Status  Yes Types  Opium              Sexual Activity       Sexually Active  Yes              Activities of Daily Living    Not Asked                 Additional Substance Use Detail       Questions Responses    Problems Due to Past Use of Alcohol? No    Problems Due to Past Use of Substances? No    Substance Use Assessment Substance use within the past 12 months    Alcohol Use Frequency Denies use in past 12 months    Cannabis frequency Never used    Comment:  Never used on 1/4/2024     Heroin Frequency Denies use in past 12 months    Cocaine frequency Never used    Comment:  Never used on 1/4/2024     Crack Cocaine Frequency Denies use in past 12 months    Methamphetamine Frequency Denies use in past 12 months    Narcotic  Frequency Denies use in past 12 months    Benzodiazepine Frequency Denies use in past 12 months    Amphetamine frequency Denies use in past 12 months    Barbituate Frequency Denies use use in past 12 months    Inhalant frequency Never used    Comment:  Never used on 1/4/2024     Hallucinogen frequency Never used    Comment:  Never used on 1/4/2024     Ecstasy frequency Never used    Comment:  Never used on 1/4/2024     Other drug frequency Never used    Comment:  Never used on 1/4/2024     Opiate frequency 1 or 2 times/week    Last reviewed by Macario Byrd RN on 1/4/2024          I have assessed this patient for substance use within the past 12 months    Family Psychiatric History:   Patient had denied    Social History:  Education:  12th grade  Marital history: single  Children: 2  Living arrangement, social support: Lives with daughter  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 No pertinent past medical history.    Medical Review Of Systems:  Pertinent items are noted in HPI.    Meds/Allergies   all current active meds have been reviewed  No Known Allergies    Objective   Vital signs in last 24 hours:  Temp:  [97 °F (36.1 °C)-97.4 °F (36.3 °C)] 97 °F (36.1 °C)  HR:  [] 83  Resp:  [16-18] 16  BP: (151-157)/(88-96) 157/95    No intake or output data in the 24 hours ending 01/05/24 0849    Mental Status Evaluation:  Appearance:  age appropriate, casually dressed, and disheveled   Behavior:  guarded, restless and fidgety, and limited cooperation   Speech:  Romanian-speaking   Mood:  irritable   Affect:  constricted   Language: Romanian-speaking   Thought Process:  goal directed and illogical   Thought Content:  Paranoia with delusions   Perceptual Disturbances: Patient denies but has had in the past per records   Risk Potential: Suicidal Ideations none  Homicidal Ideations none  Potential for Aggression Yes possible as had  become combative earlier   Sensorium:  person and place   Memory:  recent and remote memory: unable to assess due to lack of cooperation   Consciousness:  awake    Attention: attention span appeared shorter than expected for age   Intellect: within normal limits   Insight:  Poor   Judgment: Poor   Muscle Strength:  Muscle Tone: WNL   Gait/Station: normal gait/station   Motor Activity: no abnormal movements     Lab Results: I have personally reviewed all pertinent laboratory/tests results. Most Recent Labs:   Lab Results   Component Value Date    WBC 7.95 01/05/2024    RBC 5.59 (H) 01/05/2024    HGB 15.7 (H) 01/05/2024    HCT 48.8 (H) 01/05/2024     01/05/2024    RDW 14.3 01/05/2024    NEUTROABS 5.47 01/05/2024    SODIUM 140 01/05/2024    K 3.5 01/05/2024     01/05/2024    CO2 28 01/05/2024    BUN 13 01/05/2024    CREATININE 0.89 01/05/2024    GLUC 89 01/05/2024    GLUF 89 01/05/2024    CALCIUM 9.7 01/05/2024    AST 22 01/04/2024    ALT 14 01/04/2024    ALKPHOS 67 01/04/2024    TP 7.2 01/04/2024    ALB 4.2 01/04/2024    TBILI 0.35 01/04/2024    CHOLESTEROL 198 01/05/2024    HDL 81 01/05/2024    TRIG 47 01/05/2024    LDLCALC 108 (H) 01/05/2024    NONHDLC 117 01/05/2024    WOY6PHFBOSGN 5.929 (H) 01/04/2024        Code Status: Level 1 - Full Code      Patient Strengths/Assets: negotiates basic needs    Patient Barriers/Limitations: noncompliant with medication, patient is on an involuntary commitment    Counseling / Coordination of Care  Total floor / unit time spent today NA minutes. Greater than 50% of total time was spent with the patient and / or family counseling and / or coordination of care.       Length of stay : 5-7 midnights     Certification: Estimated length of stay: More than 2 midnights.   I certify that inpatient services are medically necessary for this patient for a duration of greater than 2 midnights. See H&P and MD Progress Notes for additional information about the patients course of  treatment.

## 2024-01-05 NOTE — TREATMENT TEAM
01/05/24 0914   Team Meeting   Meeting Type Daily Rounds   Initial Conference Date 01/05/24   Team Members Present   Team Members Present Physician;Nurse;;Occupational Therapist   Physician Team Member Sabine Mcgarry   Nursing Team Member Jose   Care Management Team Member Soni   Social Work Team Member Anabell   OT Team Member Sudheer   Patient/Family Present   Patient Present No   Patient's Family Present No     302 admission, BIB [place] police due to being found outside [employer] insisting to  sister's children although sister lives in [place] with her children. Pt disorganized, paranoid; placed in restraints in ED due to attempting to leave ED.     This AM pt banging on windows and then forced way into nurse's station; requires 6-7 staff to remove pt; currently in restraints, PRN Haldol IM and Ativan IM

## 2024-01-05 NOTE — CONSULTS
[facility]  Consult  Name: Olga Byrd 53 y.o. female I MRN: [id number]  Unit/Bed#: [facility] 607-02 I Date of Admission: 1/4/2024   Date of Service: 1/5/2024 I Hospital Day: 1    Inpatient consult for Medical Clearance for BH patient  Consult performed by: CHADD Gregorio  Consult ordered by: CHADD Riddle          Assessment/Plan   Medical clearance for psychiatric admission  Assessment & Plan  Vital signs stable afebrile blood pressure 150/90 and a recent outpatient care appointment her blood pressure was 117/77 I do believe that her blood pressure is elevated because she is off her psych meds she is agitated combative and now in 4 point restraints and seclusion  Patient medically cleared for admission  SL IM will sign off please call with any questions or concerns    Positive QuantiFERON-TB Gold test  Assessment & Plan  Per her recent outpatient office visit note that I reviewed patient's quant to [facility] gold was positive from 8/12/2023 from the [facility]             Counseling / Coordination of Care Time: 45 minutes.  Greater than 50% of total time spent on patient counseling and coordination of care.    Collaboration of Care: Were Recommendations Directly Discussed with Primary Treatment Team? - No     History of Present Illness:    Olga Byrd is a 53 y.o. female who is originally admitted to the psychiatry service due to disorientation and uncooperativeness. We are consulted for medical clearance for admission to Behavioral Health Unit and treatment of underlying psychiatric illness.      1/4/2024 patient presents to [place] ED in handcuffs with the [facility] police department patient was found near a school disoriented uncooperative and states stopped all antipsychotics.  Per outpatient office visit notes that were reviewed in epic patient had a recent prolonged stay in his psych facility details are unknown.  Patient does  "have a longstanding history greater than 1 year of schizoaffective disorder.  Past medical history per my retrospective chart review in epic she seems to have no significant past medical history.  She does have a QuantiFERON gold that was positive dated 8/12/2023 from the [facility] department.    Review of Systems:    Review of Systems  At the time of this encounter patient was in 4-point restraints in the seclusion room sedated and not cooperative.  Past Medical and Surgical History:     History reviewed. No pertinent past medical history.    History reviewed. No pertinent surgical history.    Meds/Allergies:    all medications and allergies reviewed    Allergies: No Known Allergies    Social History:     Marital Status: Unknown    Substance Use History:   Social History     Substance and Sexual Activity   Alcohol Use Yes     Social History     Tobacco Use   Smoking Status Never   Smokeless Tobacco Never     Social History     Substance and Sexual Activity   Drug Use Yes    Types: Opium       Family History:    non-contributory    Physical Exam:     Vitals:   Blood Pressure: 157/95 (01/05/24 0757)  Pulse: 83 (01/05/24 0757)  Temperature: (!) 97 °F (36.1 °C) (01/05/24 0757)  Temp Source: Temporal (01/05/24 0757)  Respirations: 16 (01/05/24 0757)  Height: 5' 2\" (157.5 cm) (01/04/24 2045)  Weight - Scale: 56.2 kg (123 lb 12.8 oz) (01/04/24 2045)  SpO2: 98 % (01/05/24 0757)    Physical Exam  Constitutional:       General: She is not in acute distress.     Appearance: Normal appearance. She is not ill-appearing.   HENT:      Head: Normocephalic and atraumatic.      Nose: Nose normal.      Mouth/Throat:      Mouth: Mucous membranes are moist.   Eyes:      Extraocular Movements: Extraocular movements intact.   Cardiovascular:      Rate and Rhythm: Normal rate and regular rhythm.      Heart sounds: Normal heart sounds.   Pulmonary:      Breath sounds: Normal breath sounds. No wheezing.   Abdominal:      General: " "Abdomen is flat. Bowel sounds are normal.      Palpations: Abdomen is soft.   Skin:     General: Skin is warm and dry.   Neurological:      Mental Status: She is alert. She is disoriented.      Comments: Pt has no idea where she is or what is happening         Additional Data:     Lab Results: I have personally reviewed pertinent reports.      Results from last 7 days   Lab Units 01/05/24  0529   WBC Thousand/uL 7.95   HEMOGLOBIN g/dL 15.7*   HEMATOCRIT % 48.8*   PLATELETS Thousands/uL 288   NEUTROS PCT % 68   LYMPHS PCT % 24   MONOS PCT % 6   EOS PCT % 1     Results from last 7 days   Lab Units 01/05/24  0529 01/04/24  1141   SODIUM mmol/L 140 138   POTASSIUM mmol/L 3.5 3.8   CHLORIDE mmol/L 101 104   CO2 mmol/L 28 27   BUN mg/dL 13 11   CREATININE mg/dL 0.89 0.74   ANION GAP mmol/L 11 7   CALCIUM mg/dL 9.7 9.5   ALBUMIN g/dL  --  4.2   TOTAL BILIRUBIN mg/dL  --  0.35   ALK PHOS U/L  --  67   ALT U/L  --  14   AST U/L  --  22   GLUCOSE RANDOM mg/dL 89 98             No results found for: \"HGBA1C\"        EKG, Pathology, and Other Studies Reviewed on Admission:   EKG 1/4/24 twelve-lead EKG reviewed by myself as well as interpreted by myself ventricular rate 88 QT interval 370 QTc 447 normal sinus rhythm nonspecific T wave changes.  There are no prior EKGs to compare it with there are no acute EKG findings noted this EKG.    ** Please Note: This note has been constructed using a voice recognition system. **    I have spent a total time of 45 minutes on 01/05/24 in caring for this patient including Diagnostic results, Impressions, Documenting in the medical record, Reviewing / ordering tests, medicine, procedures  , Obtaining or reviewing history  , and Communicating with other healthcare professionals .   "
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
## 2024-01-05 NOTE — ED NOTES
Insurance Authorization for admission:   Phone call placed to [facility].  Phone number: [phone].     Spoke to Jacquelin.     5 days approved.  Level of care: AIP.  Review on TBD.   Authorization # [id number].

## 2024-01-05 NOTE — NURSING NOTE
Pt observed sitting in milieu. Pt frequently at nurses station asking for purse and for release papers. Pt is demanding of staff and intrusive. Pt attempted to grab telephone behind nurses station x2. Pt needs to be redirected frequently. Pt offered PO Risperdal for agitation and pt refused. Pt then observed trying to get into nurses station to speak to dr and had to be escorted away by staff. Pt then began pushing on doors several times. Security was called and Pt was escorted to seclusion room and was observed being placed in restraints by security and staff. 0909 PT received IM Haldol 5 mg and Ativan 0.5 Im given in left anterior thigh. 1009 Pt observed resting on bed. Pt remains in 4 pt restraints. Pt is calm and quiet. No further concerns at this time. Plan of care ongoing.

## 2024-01-05 NOTE — ASSESSMENT & PLAN NOTE
Per her recent outpatient office visit note that I reviewed patient's quant to Aminah johnston was positive from 8/12/2023 from the [facility]

## 2024-01-05 NOTE — CASE MANAGEMENT
CM attempted to meet with pt to complete intake. Pt currently in restraints and lethargic due to PRN medication.

## 2024-01-05 NOTE — PROGRESS NOTES
01/05/24 1030 01/05/24 1100 01/05/24 1330   Activity/Group Checklist   Group Community meeting Other (Comment)  ( Recovery principles and values) Life Skills   Attendance Did not attend Did not attend Did not attend

## 2024-01-05 NOTE — RESTRAINT FACE TO FACE
Restraint Face to Face   Olga Byrd 53 y.o. female MRN: [id number]  Unit/Bed#: [facility] 607-02 Encounter: [id number]      Physical Evaluation: Patient is restless, good circulation, breathing heavily attempting to get out of restraints.    Purpose for Restraints/ Seclusion High risk for causing significant disruption of treatment environment , High risk for harm to others, and high risk for flight  Patient's reaction to the intervention:  agitated, irritable, yelling and cursing at staff.  Patient's medical condition: WNL    Patient's Behavioral condition:  unable to be redirected and yelling at staff.   Restraints to be Continued

## 2024-01-05 NOTE — TREATMENT PLAN
TREATMENT PLAN REVIEW - Behavioral Health Olga Byrd 53 y.o. 1970 female MRN: [id number]    [address] [facility] 607-02 Encounter: [id number]          Admit Date/Time:  1/4/2024  9:36 AM    Treatment Team:   DO Raul Martinez RN Jacqueline Almonte Jessica Jones, LPN Terry L Trittenbach, COTA    Diagnosis: Principal Problem:    Schizoaffective disorder (HCC)  Active Problems:    Medical clearance for psychiatric admission    Positive QuantiFERON-TB Gold test      Patient Strengths/Assets: negotiates basic needs    Patient Barriers/Limitations: difficulty adapting, noncompliant with treatment, patient is on an involuntary commitment, poor insight    Short Term Goals: decrease in anxiety symptoms, decrease in psychotic symptoms, decrease in level of agitation, mood stabilization    Long Term Goals: improvement in anxiety, stabilization of mood, resolution of psychotic symptoms    Progress Towards Goals: starting psychiatric medications as prescribed    Recommended Treatment: medication management, patient medication education, group therapy, milieu therapy, continued Behavioral Health psychiatric evaluation/assessment process    Treatment Frequency: daily medication monitoring, group and milieu therapy daily, monitoring through interdisciplinary rounds, monitoring through weekly patient care conferences    Expected Discharge Date:  7 to 10 midnights    Discharge Plan: referrals as indicated    Treatment Plan Created/Updated By: Andrés Matthews DO

## 2024-01-05 NOTE — CASE MANAGEMENT
Psychosocial Assessment 1:1:   Cm met with pt to complete intake. CM reviewed role of CM. Intake conducted in Chilean as pt is Chilean speaking only; reports understanding English. Pt repeatedly states need to be discharged due to her 15 yr old dtr at home. Pt reports she must sleep in same home as dtr or else dtr will be removed from pt's custody. Pt reports being brought into hospital by fake police. Pt reports she was at her dtr's school waiting for her sister to arrive to meet with pt. Pt reports she sensed something bad was happening in the school and wanted her sister to come to the school. Pt confirmed that sister resides in [place]. CM reviewed travel time from [place] to [place]; pt reports her sister spends time in [place] with cousins and was unsure if sister was local. Pt reports not receiving response from sister. Pt reports she remained at the school due to believing something bad was happening in the school. Pt reports she is not allowed lto be at the school and only her son is able to drop off and  dtr. Pt reports this is as of now due to current incident.      Admission / Details: BIB [place] police due to being found outside [facility] school insisting to  sister's children although sister lives in [place] with her children. Pt disorganized, paranoid; placed in restraints in ED due to attempting to leave ED.     County: [place]  Commitment Status:  302  Insurance: [facility] [employer]  Sabas  Rx coverage: denies issues obtaining meds  Marital Status: single  Children: son Mega Ramos tel# [phone] age 19 and dtr Kenna age 15  Family: mother Savanna Reyes tel# [phone]; resides in [place]  Residence: private  Can return home: yes  Lives with: son and dtr  Level of Ed: 12th  Work History: currently unemployed, reports waiting to hear about work  Income/Source: none; reports son supports family   P[employer]ism: [employer]  Transportation: drives self  Legal Issues: denies  Pharmacy:  [facility]  "[facility]   Treatment Hx: hospitalized in [place], [facility] x 3 months, dates unknown  Trauma Hx: would not respond  Family Hx: denies  D&A Hx:  denies  Medical: see H&P  DME:  none  Tobacco: denies   Hx:  denies  Access to firearms:denies  UDS Results:  normal  PCP: brian  Psych: Dr Mirella GUTIÉRREZ  Therapist: MARLA Kim  ICM/ACT:   denies  Community Supports:  family  Stressors: financial stressors  Strengths:  \"God gives me energy\"  Coping Skills: read books, watch [employer] shows, pramyriam bowden  ROIs Signed: mother MARLA Corrales  Treatment Plan Signed: refused to sign  IMM Signed: NA      Additional/Collateral Information: Call made to pt's mother Savanna; informed her of pt's admission. Pt's mother reports she will come to [place] to be with pt's children.   CM informed pt of mother coming to [place] to remain with children.  "

## 2024-01-05 NOTE — DISCHARGE INSTR - APPOINTMENTS
Sveta, or Flakita, our Behavioral Health Nurse Navigators, will be calling you after your discharge, on the phone number that you provided.  They will be available as an additional support, if needed.   If you wish to speak with one of them, you may contact Sveta at [phone] or Flakita at [phone].

## 2024-01-05 NOTE — NURSING NOTE
Patient is a 302 admit from \[facility]"" ED who came there in handcuffs with [employer] after she was seen outside [employer] [employer] [facility] [employer] confused and disoriented and stated she is there to  her sister's children but her sister currently resides in [place] with her children. Per ED note, patient has a PMH of schizoaffective d/o. Patient reluctantly participated in the admission process but stated she does not know why she is here and declined to sign any of the admission papers. Patient insisted on leaving that her children are in the house unattended. Patient was counseled but kept requesting for her release papers. Patient will not go to her room and stayed in the hallway sitting on a chair sleeping throughout the night and intermittently she kept asking for her release papers. Skin intact with bruise on B/L knee/wrist. Declined melatonin. Q7 minutes safety checks initiated.

## 2024-01-05 NOTE — ASSESSMENT & PLAN NOTE
Vital signs stable afebrile blood pressure 150/90 and a recent outpatient care appointment her blood pressure was 117/77 I do believe that her blood pressure is elevated because she is off her psych meds she is agitated combative and now in 4 point restraints and seclusion  Patient medically cleared for admission  SL IM will sign off please call with any questions or concerns

## 2024-01-05 NOTE — ED NOTES
Patient is accepted at [address].  Patient is accepted by Dr. Kary baker [facility].     Patient may go to the floor at anytime.          Nurse report is to be called to [phone] prior to patient transfer.

## 2024-01-06 PROCEDURE — 99233 SBSQ HOSP IP/OBS HIGH 50: CPT

## 2024-01-06 RX ORDER — BENZTROPINE MESYLATE 0.5 MG/1
0.5 TABLET ORAL DAILY PRN
Status: DISCONTINUED | OUTPATIENT
Start: 2024-01-06 | End: 2024-01-22 | Stop reason: HOSPADM

## 2024-01-06 RX ORDER — HALOPERIDOL 1 MG/1
2 TABLET ORAL
Status: DISCONTINUED | OUTPATIENT
Start: 2024-01-06 | End: 2024-01-22 | Stop reason: HOSPADM

## 2024-01-06 RX ORDER — HALOPERIDOL 5 MG/ML
2.5 INJECTION INTRAMUSCULAR EVERY 6 HOURS PRN
Status: DISCONTINUED | OUTPATIENT
Start: 2024-01-06 | End: 2024-01-22 | Stop reason: HOSPADM

## 2024-01-06 RX ORDER — LORAZEPAM 2 MG/ML
2 INJECTION INTRAMUSCULAR EVERY 6 HOURS PRN
Status: DISCONTINUED | OUTPATIENT
Start: 2024-01-06 | End: 2024-01-22 | Stop reason: HOSPADM

## 2024-01-06 RX ORDER — LORAZEPAM 2 MG/ML
1 INJECTION INTRAMUSCULAR EVERY 6 HOURS PRN
Status: DISCONTINUED | OUTPATIENT
Start: 2024-01-06 | End: 2024-01-22 | Stop reason: HOSPADM

## 2024-01-06 RX ORDER — BENZTROPINE MESYLATE 1 MG/ML
1 INJECTION INTRAMUSCULAR; INTRAVENOUS EVERY 4 HOURS PRN
Status: DISCONTINUED | OUTPATIENT
Start: 2024-01-06 | End: 2024-01-22 | Stop reason: HOSPADM

## 2024-01-06 RX ORDER — HALOPERIDOL 5 MG/1
5 TABLET ORAL EVERY 6 HOURS PRN
Status: DISCONTINUED | OUTPATIENT
Start: 2024-01-06 | End: 2024-01-06

## 2024-01-06 RX ORDER — HALOPERIDOL 5 MG/1
5 TABLET ORAL
Status: DISCONTINUED | OUTPATIENT
Start: 2024-01-06 | End: 2024-01-06

## 2024-01-06 RX ORDER — HALOPERIDOL 5 MG/1
5 TABLET ORAL EVERY 4 HOURS PRN
Status: DISCONTINUED | OUTPATIENT
Start: 2024-01-06 | End: 2024-01-22 | Stop reason: HOSPADM

## 2024-01-06 RX ORDER — AMLODIPINE BESYLATE 5 MG/1
5 TABLET ORAL DAILY
Status: DISCONTINUED | OUTPATIENT
Start: 2024-01-06 | End: 2024-01-19

## 2024-01-06 RX ORDER — HALOPERIDOL 5 MG/ML
5 INJECTION INTRAMUSCULAR EVERY 6 HOURS PRN
Status: DISCONTINUED | OUTPATIENT
Start: 2024-01-06 | End: 2024-01-22 | Stop reason: HOSPADM

## 2024-01-06 RX ORDER — HALOPERIDOL 5 MG/1
5 TABLET ORAL EVERY 6 HOURS PRN
Status: DISCONTINUED | OUTPATIENT
Start: 2024-01-06 | End: 2024-01-22 | Stop reason: HOSPADM

## 2024-01-06 RX ORDER — HYDROXYZINE 50 MG/1
50 TABLET, FILM COATED ORAL EVERY 6 HOURS PRN
Status: DISCONTINUED | OUTPATIENT
Start: 2024-01-06 | End: 2024-01-22 | Stop reason: HOSPADM

## 2024-01-06 NOTE — PLAN OF CARE
Problem: Alteration in Thoughts and Perception  Goal: Verbalize thoughts and feelings  Description: Interventions:  - Promote a nonjudgmental and trusting relationship with the patient through active listening and therapeutic communication  - Assess patient's level of functioning, behavior and potential for risk  - Engage patient in 1 on 1 interactions  - Encourage patient to express fears, feelings, frustrations, and discuss symptoms    - [place] patient to reality, help patient recognize reality-based thinking   - Administer medications as ordered and assess for potential side effects  - Provide the patient education related to the signs and symptoms of the illness and desired effects of prescribed medications  Outcome: Progressing     Problem: Anxiety  Goal: Anxiety is at manageable level  Description: Interventions:  - Assess and monitor patient's anxiety level.   - Monitor for signs and symptoms (heart palpitations, chest pain, shortness of breath, headaches, nausea, feeling jumpy, restlessness, irritable, apprehensive).   - Collaborate with interdisciplinary team and initiate plan and interventions as ordered.  - [place] patient to unit/surroundings  - Explain treatment plan  - Encourage participation in care  - Encourage verbalization of concerns/fears  - Identify coping mechanisms  - Assist in developing anxiety-reducing skills  - Administer/offer alternative therapies  - Limit or eliminate stimulants  Outcome: Progressing

## 2024-01-06 NOTE — NURSING NOTE
Patient was withdrawn to her room. Denies all psych s/s. No complaints offered. No behaviors noted but scant in conversation. Had 0% for dinner. Declined her medications that she does need medications. Safety checks ongoing.

## 2024-01-06 NOTE — NURSING NOTE
Police arrived on site due to pt calling 911 from pt phone and reporting she needs to leave. Pt informed by police that if she calls 911 again for non emergency that she will be arrested. Pt expressed understanding but continued to state she needs to leave. Limited understanding of 302 commitment when explanation provided. Pt able to remain calm following.

## 2024-01-06 NOTE — NURSING NOTE
Pt able to maintain calm and appropriate behaviors on unit. Pt walking in hallways, coloring in dayroom, reading bible, and watching tv. Pt stated she spoke to daughter on phone. Pt denies all symptoms during shift. Pt declined scheduled risperdal during shift, stated she does not need it and it is not prescribed by her outpatient physician. Pt did report mild constipation, accepted prune juice.

## 2024-01-06 NOTE — NURSING NOTE
Pt declined scheduled risperdal and norvasc when offered. Stated she is seeing outpatient physician. Denies any difficulties with mood.

## 2024-01-07 PROCEDURE — 99233 SBSQ HOSP IP/OBS HIGH 50: CPT

## 2024-01-07 NOTE — NURSING NOTE
Patient was withdrawn to her room. Denies all psych s/s. No behaviors noted. No complaints offered. Declined HS medication. Safety checks ongoing.

## 2024-01-07 NOTE — PLAN OF CARE
Problem: Depression  Goal: Treatment Goal: Demonstrate behavioral control of depressive symptoms, verbalize feelings of improved mood/affect, and adopt new coping skills prior to discharge  Outcome: Progressing  Goal: Verbalize thoughts and feelings  Description: Interventions:  - Assess and re-assess patient's level of risk   - Engage patient in 1:1 interactions, daily, for a minimum of 15 minutes   - Encourage patient to express feelings, fears, frustrations, hopes   Outcome: Progressing  Goal: Refrain from harming self  Description: Interventions:  - Monitor patient closely, per order   - Supervise medication ingestion, monitor effects and side effects   Outcome: Progressing  Goal: Refrain from isolation  Description: Interventions:  - Develop a trusting relationship   - Encourage socialization   Outcome: Progressing  Goal: Refrain from self-neglect  Outcome: Progressing  Goal: Attend and participate in unit activities, including therapeutic, recreational, and educational groups  Description: Interventions:  - Provide therapeutic and educational activities daily, encourage attendance and participation, and document same in the medical record   Outcome: Progressing  Goal: Complete daily ADLs, including personal hygiene independently, as able  Description: Interventions:  - Observe, teach, and assist patient with ADLS  -  Monitor and promote a balance of rest/activity, with adequate nutrition and elimination   Outcome: Progressing     Problem: Anxiety  Goal: Anxiety is at manageable level  Description: Interventions:  - Assess and monitor patient's anxiety level.   - Monitor for signs and symptoms (heart palpitations, chest pain, shortness of breath, headaches, nausea, feeling jumpy, restlessness, irritable, apprehensive).   - Collaborate with interdisciplinary team and initiate plan and interventions as ordered.  - [place] patient to unit/surroundings  - Explain treatment plan  - Encourage participation in  care  - Encourage verbalization of concerns/fears  - Identify coping mechanisms  - Assist in developing anxiety-reducing skills  - Administer/offer alternative therapies  - Limit or eliminate stimulants  Outcome: Progressing
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
## 2024-01-07 NOTE — NURSING NOTE
Pt visible on unit, watching tv in small tv room. Social with select peers. Pt displays bright affect, denies all symptoms currently. Pt declined scheduled medications despite encouragement.

## 2024-01-08 PROCEDURE — 99233 SBSQ HOSP IP/OBS HIGH 50: CPT | Performed by: PSYCHIATRY & NEUROLOGY

## 2024-01-08 RX ADMIN — RISPERIDONE 1 MG: 1 TABLET, ORALLY DISINTEGRATING ORAL at 17:27

## 2024-01-08 NOTE — NURSING NOTE
Pt focused on discharge in a.m. and anxious while requesting to speak to physician. Pt redirectable. Calm behaviors throughout shift. Pt able to make needs known appropriately. Pt did accept 1700 dose of risperdal when offered. Pt denies all symptoms during shift.
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
## 2024-01-08 NOTE — TREATMENT TEAM
01/08/24 0920   Team Meeting   Meeting Type Daily Rounds   Initial Conference Date 01/08/24   Team Members Present   Team Members Present Physician;Nurse;;;Occupational Therapist   Physician Team Member Sabine Mcgarry   Nursing Team Member Richa   Care Management Team Member Soni   Social Work Team Member Anabell   OT Team Member Sudheer   Patient/Family Present   Patient Present No   Patient's Family Present No     Called police from phone multiple times; police came to unit to meet with pt, pt reportedly called police due to wanting discharge, police advised pt to not call for this reason and if pt called for this reason again pt would be arrested. Pt cont to decline scheduled PO meds, denies all symptoms, appears internally preoccupied, focused on discharge, possible plan of meds over objection

## 2024-01-08 NOTE — CASE MANAGEMENT
Multiple calls made to [facility]; messages left requesting c/b as soon as possible.    CM spoke with pt's mother Savanna Reyes, tel# [phone]; informed she did not go to pt's home this weekend due to no transportation. Pt's mother reports plans to come on bus tomorrow at 3 pm. Pt's mother reports pt told her that pt no longer needs medications. Pt's mother reports pt does not do well off medications although would not elaborate on symptoms. CM attempted to obtain information regarding pt's [place] hospitalization. Pt's mother report it was due to pt stopping medications.

## 2024-01-08 NOTE — PROGRESS NOTES
01/08/24 1030 01/08/24 1100   Activity/Group Checklist   Group Community meeting  (positive affirmations.) Life Skills  (stress ball task and coping discussion.)   Attendance Attended Attended   Attendance Duration (min) 16-30 31-45   Interactions Interacted appropriately  (Pt. set goal to communicate more effectively with her DrKole and inquire about the plan for recovery and eventual discharge.) Interacted appropriately   Affect/Mood Appropriate;Calm Angry;Calm;Normal range   Goals Achieved Able to engage in interactions;Discussed coping strategies Able to engage in interactions;Able to listen to others;Discussed coping strategies;Identified feelings

## 2024-01-08 NOTE — PLAN OF CARE
Pt. Attended both morning groups and interacted calmly and appropriately.Desires to be listened to more by staff and was able to listen more effectively in social setting.  Problem: Alteration in Thoughts and Perception  Goal: Attend and participate in unit activities, including therapeutic, recreational, and educational groups  Description: Interventions:  -Encourage Visitation and family involvement in care  Outcome: Progressing

## 2024-01-08 NOTE — PLAN OF CARE
Problem: Alteration in Thoughts and Perception  Goal: Treatment Goal: Gain control of psychotic behaviors/thinking, reduce/eliminate presenting symptoms and demonstrate improved reality functioning upon discharge  Outcome: Not Progressing  Goal: Verbalize thoughts and feelings  Description: Interventions:  - Promote a nonjudgmental and trusting relationship with the patient through active listening and therapeutic communication  - Assess patient's level of functioning, behavior and potential for risk  - Engage patient in 1 on 1 interactions  - Encourage patient to express fears, feelings, frustrations, and discuss symptoms    - [place] patient to reality, help patient recognize reality-based thinking   - Administer medications as ordered and assess for potential side effects  - Provide the patient education related to the signs and symptoms of the illness and desired effects of prescribed medications  Outcome: Progressing  Goal: Refrain from acting on delusional thinking/internal stimuli  Description: Interventions:  - Monitor patient closely, per order   - Utilize least restrictive measures   - Set reasonable limits, give positive feedback for acceptable   - Administer medications as ordered and monitor of potential side effects  Outcome: Progressing  Goal: Agree to be compliant with medication regime, as prescribed and report medication side effects  Description: Interventions:  - Offer appropriate PRN medication and supervise ingestion; conduct AIMS, as needed   Outcome: Not Progressing  Goal: Attend and participate in unit activities, including therapeutic, recreational, and educational groups  Description: Interventions:  -Encourage Visitation and family involvement in care  Outcome: Progressing  Goal: Recognize dysfunctional thoughts, communicate reality-based thoughts at the time of discharge  Description: Interventions:  - Provide medication and psycho-education to assist patient in compliance and  developing insight into his/her illness   Outcome: Not Progressing  Goal: Complete daily ADLs, including personal hygiene independently, as able  Description: Interventions:  - Observe, teach, and assist patient with ADLS  - Monitor and promote a balance of rest/activity, with adequate nutrition and elimination   Outcome: Progressing

## 2024-01-09 PROCEDURE — 99232 SBSQ HOSP IP/OBS MODERATE 35: CPT | Performed by: PSYCHIATRY & NEUROLOGY

## 2024-01-09 RX ADMIN — RISPERIDONE 1 MG: 1 TABLET, ORALLY DISINTEGRATING ORAL at 17:20

## 2024-01-09 RX ADMIN — RISPERIDONE 1 MG: 1 TABLET, ORALLY DISINTEGRATING ORAL at 08:46

## 2024-01-09 NOTE — TREATMENT TEAM
Pt attended  Recovery: mindfulness and engagement group.  Incongruent affect and impulsive reposnes when other were speaking. Pt bright.     01/09/24 1100   Activity/Group Checklist   Group Other (Comment)  ( Recovery: mindfulness and engagement)   Attendance Attended   Attendance Duration (min) 31-45   Interactions Interacted appropriately   Affect/Mood Incongruent   Goals Achieved Identified feelings;Discussed self-esteem issues;Discussed coping strategies;Able to listen to others;Able to engage in interactions;Able to reflect/comment on own behavior;Able to manage/cope with feelings;Able to self-disclose;Able to recieve feedback
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
## 2024-01-09 NOTE — PROGRESS NOTES
01/09/24 1015 01/09/24 1330   Activity/Group Checklist   Group Community meeting  (poetry writing and orientation.) Life Skills  (art task and relaxation music topic healthy risks.)   Attendance Attended Attended   Attendance Duration (min) 31-45 46-60   Interactions Interacted appropriately  (soft spoken and lead the group to writing a poem about life.Pt. pleasant,appreciative and able to communicate calmly.) Interacted appropriately  (Pt. completed her art task as instructed and displayed appropriate mindfulness.)   Affect/Mood Calm;Normal range Appropriate;Calm;Normal range   Goals Achieved Able to engage in interactions;Able to listen to others;Discussed coping strategies;Identified feelings Able to engage in interactions;Discussed coping strategies
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
## 2024-01-09 NOTE — CASE MANAGEMENT
Call made to leo GUTIÉRREZ requesting call back as soon as possible regarding pt's outpatient treatment and med management plan. CM also requested call from pt's therapist.

## 2024-01-09 NOTE — TREATMENT TEAM
01/09/24 0901   Team Meeting   Meeting Type Daily Rounds   Initial Conference Date 01/09/24   Team Members Present   Team Members Present Physician;Nurse;;Occupational Therapist;Other (Discipline and Name)   Physician Team Member Sabine Mcgarry   Nursing Team Member Rosy   Care Management Team Member Soni ALAN Team Member Sudheer   Other (Discipline and Name) [facility]acy: [facility]   Patient/Family Present   Patient Present No   Patient's Family Present No     303 hearing held this AM, remains medication resistant, refused melatonin, compliant with risperidal this AM, focused on d/c to care for children, denies symptoms, cont to refuse contact with son

## 2024-01-09 NOTE — NURSING NOTE
Patient is pleasant , bright on approach. Patient refused BP medication stating she was never on that and accepted psych medication stating she is only taking it while she is here. She stated Dr. Kam from [facility] will not want her to take it and he will discontinue it . Patient is visible on the unit, pacing the hallway, comes to the groups and has  good meal intake. Patient is preoccupied with d/c. Denies SI,HI.

## 2024-01-09 NOTE — NURSING NOTE
"Patient withdrawn to room this shift but visible for snack. She endorses anxiety related to discharge and believes she is leaving tomorrow. She refused HS dose of melatonin stating \"I don't need it.\" Encouraged to inform staff of any needs or concerns.   "

## 2024-01-09 NOTE — NURSING NOTE
Patient was visible in the milieu but kept to self. Denies all psych s/s. No complaints offered. No behaviors noted. Had 100% for dinner. Took her PM medication. Safety checks ongoing.

## 2024-01-09 NOTE — PLAN OF CARE
Problem: Alteration in Thoughts and Perception  Goal: Treatment Goal: Gain control of psychotic behaviors/thinking, reduce/eliminate presenting symptoms and demonstrate improved reality functioning upon discharge  Outcome: Not Progressing  Goal: Agree to be compliant with medication regime, as prescribed and report medication side effects  Description: Interventions:  - Offer appropriate PRN medication and supervise ingestion; conduct AIMS, as needed   Outcome: Not Progressing  Goal: Recognize dysfunctional thoughts, communicate reality-based thoughts at the time of discharge  Description: Interventions:  - Provide medication and psycho-education to assist patient in compliance and developing insight into his/her illness   Outcome: Not Progressing  Goal: Complete daily ADLs, including personal hygiene independently, as able  Description: Interventions:  - Observe, teach, and assist patient with ADLS  - Monitor and promote a balance of rest/activity, with adequate nutrition and elimination   Outcome: Progressing     Problem: Ineffective Coping  Goal: Understands least restrictive measures  Description: Interventions:  - Utilize least restrictive behavior  Outcome: Progressing  Goal: Free from restraint events  Description: - Utilize least restrictive measures   - Provide behavioral interventions   - Redirect inappropriate behaviors   Outcome: Progressing     Problem: Depression  Goal: Treatment Goal: Demonstrate behavioral control of depressive symptoms, verbalize feelings of improved mood/affect, and adopt new coping skills prior to discharge  Outcome: Progressing  Goal: Refrain from harming self  Description: Interventions:  - Monitor patient closely, per order   - Supervise medication ingestion, monitor effects and side effects   Outcome: Progressing  Goal: Refrain from isolation  Description: Interventions:  - Develop a trusting relationship   - Encourage socialization   Outcome: Not Progressing     Problem:  Anxiety  Goal: Anxiety is at manageable level  Description: Interventions:  - Assess and monitor patient's anxiety level.   - Monitor for signs and symptoms (heart palpitations, chest pain, shortness of breath, headaches, nausea, feeling jumpy, restlessness, irritable, apprehensive).   - Collaborate with interdisciplinary team and initiate plan and interventions as ordered.  - [place] patient to unit/surroundings  - Explain treatment plan  - Encourage participation in care  - Encourage verbalization of concerns/fears  - Identify coping mechanisms  - Assist in developing anxiety-reducing skills  - Administer/offer alternative therapies  - Limit or eliminate stimulants  Outcome: Progressing     Problem: Alteration in Orientation  Goal: Treatment Goal: Demonstrate a reduction of confusion and improved orientation to person, place, time and/or situation upon discharge, according to optimum baseline/ability  Outcome: Not Progressing  Goal: Interact with staff daily  Description: Interventions:  - Assess and re-assess patient's level of orientation  - Engage patient in 1 on 1 interactions, daily, for a minimum of 15 minutes   - Establish rapport/trust with patient   Outcome: Progressing

## 2024-01-09 NOTE — CASE MANAGEMENT
303 hearing held with [facility] court. Pt and Dr Matthews testified. Awaiting court decision.

## 2024-01-10 PROCEDURE — 99232 SBSQ HOSP IP/OBS MODERATE 35: CPT | Performed by: PSYCHIATRY & NEUROLOGY

## 2024-01-10 RX ORDER — RISPERIDONE 1 MG/1
1 TABLET, ORALLY DISINTEGRATING ORAL DAILY
Status: DISCONTINUED | OUTPATIENT
Start: 2024-01-11 | End: 2024-01-11

## 2024-01-10 RX ORDER — RISPERIDONE 2 MG/1
2 TABLET, ORALLY DISINTEGRATING ORAL
Status: DISCONTINUED | OUTPATIENT
Start: 2024-01-10 | End: 2024-01-11

## 2024-01-10 RX ADMIN — RISPERIDONE 1 MG: 1 TABLET, ORALLY DISINTEGRATING ORAL at 08:30

## 2024-01-10 RX ADMIN — RISPERIDONE 2 MG: 2 TABLET, ORALLY DISINTEGRATING ORAL at 21:40
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
## 2024-01-10 NOTE — CASE MANAGEMENT
CM spoke with pt's mother Savanna Reyes; update provided. Savanna reports pt was paranoid and refused medications in the past which resulted in pt's hospitalization in [place]. Savanna reports she arrived yesterday and pt's children are well; reports there is food in home and home is in good condition. Pt's mother reports pt's son is at work and dtr at school. Pt's mother reports main concern is that pt will not continue medication upon d/c. Pt's mother reports pt is very involved with her Islam and pt enjoys shopping. Pt does not work. Pt's mother reports pt does not work due to psychiatrist not clearing pt for work. CM questioned reason and pt's mother was unsure. Pt's mother reports family assists pt financially so pt is not at risk of losing apt. Pt's mother reports she will remain with pt for some time although will have to return to [place] for her medical appts but will then return next day. CM informed pt's mother that pt will not allow CM to speak with Mega. Pt's mother to encourage pt to allow CM to speak with pt's son.

## 2024-01-10 NOTE — NURSING NOTE
Pt visible on the unit but had minimal interaction with peers. Pt refused HS medication. Pt denies anxiety, depression, AH/VH/SI/HI. Pt denies any unmet needs at this time. Continual rounding in place.

## 2024-01-10 NOTE — PLAN OF CARE
Problem: Alteration in Thoughts and Perception  Goal: Verbalize thoughts and feelings  Description: Interventions:  - Promote a nonjudgmental and trusting relationship with the patient through active listening and therapeutic communication  - Assess patient's level of functioning, behavior and potential for risk  - Engage patient in 1 on 1 interactions  - Encourage patient to express fears, feelings, frustrations, and discuss symptoms    - [place] patient to reality, help patient recognize reality-based thinking   - Administer medications as ordered and assess for potential side effects  - Provide the patient education related to the signs and symptoms of the illness and desired effects of prescribed medications  Outcome: Progressing  Goal: Agree to be compliant with medication regime, as prescribed and report medication side effects  Description: Interventions:  - Offer appropriate PRN medication and supervise ingestion; conduct AIMS, as needed   Outcome: Progressing     Problem: Ineffective Coping  Goal: Identifies healthy coping skills  Outcome: Progressing     Problem: Depression  Goal: Treatment Goal: Demonstrate behavioral control of depressive symptoms, verbalize feelings of improved mood/affect, and adopt new coping skills prior to discharge  Outcome: Progressing

## 2024-01-10 NOTE — NURSING NOTE
Pt is calm, cooperative and visible on unit. Pt denies depression and anxiety; denies SI/HI/AH/VH. Pt remains preoccupied with discharge but she is redirectable. Compliant with medications, Norvasc held due to parameters. Able to make needs known. Will maintain q7 min checks.

## 2024-01-10 NOTE — TREATMENT TEAM
01/10/24 0921   Team Meeting   Meeting Type Daily Rounds   Initial Conference Date 01/10/24   Team Members Present   Team Members Present Physician;Occupational Therapist;Nurse;;   Physician Team Member Sabine Mcgarry   Nursing Team Member Rosy   Care Management Team Member Soni   Social Work Team Member Anabell   OT Team Member Sudheer   Patient/Family Present   Patient Present No   Patient's Family Present No     Focused on dc, pleasant, calm, bizarre at times, refused melatonin, compliant with risperdal

## 2024-01-10 NOTE — PROGRESS NOTES
Pt. Very focused on discharge yet was redirectable and calm in groups.   01/10/24 1030 01/10/24 1330 01/10/24 1400   Activity/Group Checklist   Group Community meeting Exercise Admission/Discharge  (completed relapse prevention plan.)   Attendance Did not attend Attended Attended   Attendance Duration (min)  --  31-45 0-15  (1-1)   Interactions  --  Interacted appropriately Other (Comment)  (pt. minimized the reasons for admission yet was able to state awareness of worry about her daughter in school.)   Affect/Mood  --  Appropriate;Calm;Normal range;Bright Wide   Goals Achieved  --  Able to engage in interactions;Able to listen to others;Discussed coping strategies Able to engage in interactions;Identified relapse prevention strategies

## 2024-01-11 PROCEDURE — 99232 SBSQ HOSP IP/OBS MODERATE 35: CPT | Performed by: PSYCHIATRY & NEUROLOGY

## 2024-01-11 RX ORDER — RISPERIDONE 2 MG/1
2 TABLET, ORALLY DISINTEGRATING ORAL 2 TIMES DAILY
Status: DISCONTINUED | OUTPATIENT
Start: 2024-01-11 | End: 2024-01-15

## 2024-01-11 RX ADMIN — RISPERIDONE 2 MG: 2 TABLET, ORALLY DISINTEGRATING ORAL at 17:19

## 2024-01-11 RX ADMIN — RISPERIDONE 1 MG: 1 TABLET, ORALLY DISINTEGRATING ORAL at 08:35
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
## 2024-01-11 NOTE — NURSING NOTE
Patient visible and social on unit. Patient at times appears confused however was able to state she is in a hospital and has been for 6 days. Patient approached nursing station at 2230 reporting deperately needing to call her mother to assure her 15yr old daughter got home safely which we allowed a 5 min phone call and patient was appreciative. Patient med compliant. Appetite intact. Continual rounding in place

## 2024-01-11 NOTE — PROGRESS NOTES
01/11/24 1030   Activity/Group Checklist   Group Community meeting   Attendance Attended   Attendance Duration (min) 31-45   Interactions Other (Comment)  (pt. stated lack of understanding why her son wants to be in charge of her would,pt. states would  rather it be her brother.)   Affect/Mood Wide   Goals Achieved Able to listen to others;Identified feelings;Able to engage in interactions;Able to self-disclose

## 2024-01-11 NOTE — PLAN OF CARE
Problem: Alteration in Thoughts and Perception  Goal: Verbalize thoughts and feelings  Description: Interventions:  - Promote a nonjudgmental and trusting relationship with the patient through active listening and therapeutic communication  - Assess patient's level of functioning, behavior and potential for risk  - Engage patient in 1 on 1 interactions  - Encourage patient to express fears, feelings, frustrations, and discuss symptoms    - [place] patient to reality, help patient recognize reality-based thinking   - Administer medications as ordered and assess for potential side effects  - Provide the patient education related to the signs and symptoms of the illness and desired effects of prescribed medications  Outcome: Progressing  Goal: Agree to be compliant with medication regime, as prescribed and report medication side effects  Description: Interventions:  - Offer appropriate PRN medication and supervise ingestion; conduct AIMS, as needed   Outcome: Progressing     Problem: Ineffective Coping  Goal: Demonstrates healthy coping skills  Outcome: Progressing     Problem: Depression  Goal: Treatment Goal: Demonstrate behavioral control of depressive symptoms, verbalize feelings of improved mood/affect, and adopt new coping skills prior to discharge  Outcome: Progressing  Goal: Refrain from harming self  Description: Interventions:  - Monitor patient closely, per order   - Supervise medication ingestion, monitor effects and side effects   Outcome: Progressing

## 2024-01-11 NOTE — TREATMENT TEAM
Pt attended Coping skills and humor group.  Pt was able to self express and stay for duration.  Pt was bright and cooperative.,  Pt able to reminisce about her favorite toy and tv show.  Pt note her child did an activity (musically) locally at [employer].      01/11/24 1330   Activity/Group Checklist   Group Other (Comment)  (Coping skills and humor group)   Attendance Attended   Attendance Duration (min) 31-45   Interactions Interacted appropriately   Affect/Mood Bright   Goals Achieved Identified feelings;Discussed self-esteem issues;Discussed coping strategies;Able to listen to others;Able to engage in interactions;Able to reflect/comment on own behavior;Able to manage/cope with feelings;Verbalized increased hopefulness;Able to self-disclose

## 2024-01-11 NOTE — TREATMENT TEAM
01/11/24 0902   Team Meeting   Meeting Type Daily Rounds   Initial Conference Date 01/11/24   Team Members Present   Team Members Present Physician;Nurse;;;Occupational Therapist   Physician Team Member Sabine Mcgarry   Nursing Team Member Rosy   Care Management Team Member Soni   Social Work Team Member Anabell ALAN Team Member Sudheer   Patient/Family Present   Patient Present No   Patient's Family Present No     Compliant with risperidal, completed relapse prevention plan, focused on d/c

## 2024-01-11 NOTE — NURSING NOTE
Patient cooperative with care, visible in milieu, makes needs known. Isolative to self, withdrawn to room at times, makes needs known. Preoccupied with discharge and wanting to go home, patient counseled. Denied anxiety/depression,denies SI/HI, AH/VH, safety precautions maintained.

## 2024-01-12 PROCEDURE — 99232 SBSQ HOSP IP/OBS MODERATE 35: CPT | Performed by: PSYCHIATRY & NEUROLOGY

## 2024-01-12 RX ADMIN — RISPERIDONE 2 MG: 2 TABLET, ORALLY DISINTEGRATING ORAL at 08:58

## 2024-01-12 RX ADMIN — RISPERIDONE 2 MG: 2 TABLET, ORALLY DISINTEGRATING ORAL at 20:57

## 2024-01-12 NOTE — TREATMENT TEAM
01/12/24 0900   Team Meeting   Meeting Type Daily Rounds   Initial Conference Date 01/12/24   Team Members Present   Team Members Present Physician;Nurse;;Occupational Therapist;Other (Discipline and Name)   Physician Team Member Dr Matthews   Nursing Team Member Jose   Care Management Team Member Soni ALAN Team Member    Other (Discipline and Name) Pharmacy: [facility]   Patient/Family Present   Patient Present No   Patient's Family Present No     Internally preoccupied, superficial, guarded, focused on d/c, plan to increase risperdal, review option for [place]

## 2024-01-12 NOTE — NURSING NOTE
"Patient is calm, and pleasant. Stated \"I am doing fine thanks to the lord\". Refused her am amlodipine, stated \"I don't need it\". Visible in milieu but isolative to self with limited interactions with peers. Denies anxiety/depression, denies AH/VH, SI/HI. Will continue to monitor and maintain q 7 min checks.  "

## 2024-01-12 NOTE — PROGRESS NOTES
Pt. With less irritability,attending one of two groups today and not focused on when she will be discharged.   01/12/24 1030 01/12/24 1400   Activity/Group Checklist   Group Community meeting Wellness   Attendance Attended Did not attend   Attendance Duration (min) 31-45  --    Interactions Interacted appropriately  --    Affect/Mood Appropriate;Calm;Normal range  --    Goals Achieved Able to reflect/comment on own behavior;Able to engage in interactions  --
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
## 2024-01-13 PROCEDURE — 99232 SBSQ HOSP IP/OBS MODERATE 35: CPT | Performed by: STUDENT IN AN ORGANIZED HEALTH CARE EDUCATION/TRAINING PROGRAM

## 2024-01-13 RX ORDER — LORATADINE 10 MG/1
10 TABLET ORAL DAILY
Status: DISCONTINUED | OUTPATIENT
Start: 2024-01-13 | End: 2024-01-22 | Stop reason: HOSPADM

## 2024-01-13 RX ORDER — FLUTICASONE PROPIONATE 50 MCG
2 SPRAY, SUSPENSION (ML) NASAL 2 TIMES DAILY
Status: DISCONTINUED | OUTPATIENT
Start: 2024-01-13 | End: 2024-01-22 | Stop reason: HOSPADM

## 2024-01-13 RX ADMIN — RISPERIDONE 2 MG: 2 TABLET, ORALLY DISINTEGRATING ORAL at 17:19

## 2024-01-13 RX ADMIN — FLUTICASONE PROPIONATE 2 SPRAY: 50 SPRAY, METERED NASAL at 18:05

## 2024-01-13 RX ADMIN — LORATADINE 10 MG: 10 TABLET ORAL at 13:14

## 2024-01-13 RX ADMIN — FLUTICASONE PROPIONATE 2 SPRAY: 50 SPRAY, METERED NASAL at 14:11

## 2024-01-13 RX ADMIN — RISPERIDONE 2 MG: 2 TABLET, ORALLY DISINTEGRATING ORAL at 08:44

## 2024-01-13 NOTE — NURSING NOTE
Patient visible on unit. Patient denies psych s/s. Patient med compliant. Patients appetite intact. VSS. Denies any unmet needs. Patient's purse taken out of locked closet and was to be given to patient's family however the family never came. Purse put in belongs bag with patient sticker on bag and purse for pickup tomorrow in charting area. Continual rounding in place

## 2024-01-13 NOTE — PLAN OF CARE
Problem: Alteration in Thoughts and Perception  Goal: Treatment Goal: Gain control of psychotic behaviors/thinking, reduce/eliminate presenting symptoms and demonstrate improved reality functioning upon discharge  Outcome: Progressing  Goal: Verbalize thoughts and feelings  Description: Interventions:  - Promote a nonjudgmental and trusting relationship with the patient through active listening and therapeutic communication  - Assess patient's level of functioning, behavior and potential for risk  - Engage patient in 1 on 1 interactions  - Encourage patient to express fears, feelings, frustrations, and discuss symptoms    - [place] patient to reality, help patient recognize reality-based thinking   - Administer medications as ordered and assess for potential side effects  - Provide the patient education related to the signs and symptoms of the illness and desired effects of prescribed medications  Outcome: Progressing  Goal: Refrain from acting on delusional thinking/internal stimuli  Description: Interventions:  - Monitor patient closely, per order   - Utilize least restrictive measures   - Set reasonable limits, give positive feedback for acceptable   - Administer medications as ordered and monitor of potential side effects  Outcome: Progressing  Goal: Agree to be compliant with medication regime, as prescribed and report medication side effects  Description: Interventions:  - Offer appropriate PRN medication and supervise ingestion; conduct AIMS, as needed   Outcome: Progressing  Goal: Attend and participate in unit activities, including therapeutic, recreational, and educational groups  Description: Interventions:  -Encourage Visitation and family involvement in care  Outcome: Progressing  Goal: Recognize dysfunctional thoughts, communicate reality-based thoughts at the time of discharge  Description: Interventions:  - Provide medication and psycho-education to assist patient in compliance and developing  insight into his/her illness   Outcome: Progressing  Goal: Complete daily ADLs, including personal hygiene independently, as able  Description: Interventions:  - Observe, teach, and assist patient with ADLS  - Monitor and promote a balance of rest/activity, with adequate nutrition and elimination   Outcome: Progressing     Problem: Depression  Goal: Treatment Goal: Demonstrate behavioral control of depressive symptoms, verbalize feelings of improved mood/affect, and adopt new coping skills prior to discharge  Outcome: Progressing  Goal: Refrain from harming self  Description: Interventions:  - Monitor patient closely, per order   - Supervise medication ingestion, monitor effects and side effects   Outcome: Progressing  Goal: Refrain from isolation  Description: Interventions:  - Develop a trusting relationship   - Encourage socialization   Outcome: Progressing  Goal: Refrain from self-neglect  Outcome: Progressing     Problem: Anxiety  Goal: Anxiety is at manageable level  Description: Interventions:  - Assess and monitor patient's anxiety level.   - Monitor for signs and symptoms (heart palpitations, chest pain, shortness of breath, headaches, nausea, feeling jumpy, restlessness, irritable, apprehensive).   - Collaborate with interdisciplinary team and initiate plan and interventions as ordered.  - [place] patient to unit/surroundings  - Explain treatment plan  - Encourage participation in care  - Encourage verbalization of concerns/fears  - Identify coping mechanisms  - Assist in developing anxiety-reducing skills  - Administer/offer alternative therapies  - Limit or eliminate stimulants  Outcome: Progressing     Problem: Alteration in Orientation  Goal: Treatment Goal: Demonstrate a reduction of confusion and improved orientation to person, place, time and/or situation upon discharge, according to optimum baseline/ability  Outcome: Progressing  Goal: Interact with staff daily  Description: Interventions:  - Assess  and re-assess patient's level of orientation  - Engage patient in 1 on 1 interactions, daily, for a minimum of 15 minutes   - Establish rapport/trust with patient   Outcome: Progressing  Goal: Allow medical examinations, as recommended  Description: Interventions:  - Provide physical/neurological exams and/or referrals, per provider   Outcome: Progressing  Goal: Cooperate with recommended testing/procedures  Description: Interventions:  - Determine need for ancillary testing  - Observe for mental status changes  - Implement falls/precaution protocol   Outcome: Progressing

## 2024-01-13 NOTE — NURSING NOTE
Patient is calm and cooperative. She is somewhat bizarre in affect. Frequently has inappropriate smile. She is present in milieu.

## 2024-01-13 NOTE — PROGRESS NOTES
"Progress Note - Behavioral Health   Olga Byrd 53 y.o. female MRN: [id number]  Unit/Bed#: [facility] 607-02 Encounter: [id number]      Subjective:     Documentation, nursing notes, medication reconciliation, labs, and vitals reviewed. Patient was seen for continuing care and reviewed with treatment team.  No acute events over the past 24 hours. Per nursing report, patient declines amlodipine; BP WNL. Medication changes over the past 24 hours: Claritin and Flonase added.    On evaluation today, Olga reports that her mood is \"good\".  She is preoccupied with discharge as she feels her children need her at home, adding that her mother is elderly.  She is superficial and guarded.  States she was admitted to the hospital after presenting to her children's school and fearing for their safety due to their lack of video surveillance.  She continues to tolerate her current medication regimen and states \"I will take it when I am home\".  Denies depression and does not appear anxious.   No self-harming/suicidal ideation, plan, or intent upon direct inquiry. No thoughts to harm others.  No agitation or aggression noted.  She denies perceptual disturbances and appears internally preoccupied.      Psychiatric ROS:  Behavior over the last 24 hours: unchanged  Sleep: normal  Appetite: normal  Medication side effects: No   ROS: no complaints, all other systems are negative      Mental Status Evaluation:    Appearance:  age appropriate, casually dressed, dressed appropriately, adequate grooming   Behavior:  pleasant, cooperative   Speech:  normal rate, normal volume, normal pitch   Mood:  euthymic   Affect:  brighter   Thought Process:  illogical, perseverative   Associations: perseveration   Thought Content:  paranoid ideation, ruminating thoughts   Perceptual Disturbances: denies auditory hallucinations when asked   Risk Potential: Suicidal ideation - None at present  Homicidal ideation - None at present  Potential for aggression - " Not at present   Sensorium:  oriented to person, place, and time/date   Memory:  recent and remote memory grossly intact   Consciousness:  alert and awake   Attention/Concentration: attention span and concentration: unable to assess due to lack of cooperation   Insight:  poor   Judgment: poor   Gait/Station: normal gait/station   Motor Activity: no abnormal movements       Vital signs in last 24 hours:    Temp:  [97.3 °F (36.3 °C)-97.8 °F (36.6 °C)] 97.3 °F (36.3 °C)  HR:  [75-90] 75  Resp:  [16-20] 16  BP: (107-115)/(66-79) 107/77    Laboratory results: I have personally reviewed all pertinent laboratory/tests results    Results from the past 24 hours: No results found for this or any previous visit (from the past 24 hour(s)).      Progress Toward Goals: progressing    Suicide/Homicide Risk Assessment:    Risk of Harm to Self:   Nursing Suicide Risk Assessment Last 24 hours: C-SSRS Risk (Since Last Contact)  Calculated C-SSRS Risk Score (Since Last Contact): No Risk Indicated    Risk of Harm to Others:  Nursing Homicide Risk Assessment: Violence Risk to Others: Denies within past 6 months    Assessment/Plan   Principal Problem:    Schizoaffective disorder (HCC)  Active Problems:    Medical clearance for psychiatric admission    Positive QuantiFERON-TB Gold test      Recommended Treatment:     Planned medication and treatment changes:    All current active medications have been reviewed  Encourage group therapy, milieu therapy and occupational therapy  Behavioral Health checks every 7 minutes  Continue with [facility] medical management as indicated  Disposition planning ongoing  Continue current medications:    Current Facility-Administered Medications   Medication Dose Route Frequency Provider Last Rate    aluminum-magnesium hydroxide-simethicone  30 mL Oral Q4H PRN Kary Wilson MD      amLODIPine  5 mg Oral Daily CHADD Gregorio      benztropine  1 mg Intramuscular Q4H PRN CHADD Riddle       benztropine  0.5 mg Oral Daily PRN Sabine Kendrickey, CRNP      fluticasone  2 spray Each Nare BID Jennifer Martinez, CRNP      haloperidol  2 mg Oral Q4H PRN Max 6/day Sabine Kendrickey, CRNP      haloperidol  5 mg Oral Q4H PRN Sabine Kendrickey, CRNP      haloperidol  5 mg Oral Q6H PRN Sabine Kendrickey, CRNP      haloperidol lactate  2.5 mg Intramuscular Q6H PRN Sabine Kendrickey, CRNP      And    LORazepam  1 mg Intravenous Q6H PRN Sabine Hangey, CRNP      haloperidol lactate  5 mg Intramuscular Q6H PRN Sabine Kendrickey, CRNP      And    LORazepam  2 mg Intravenous Q6H PRN Sabine Kendrickey, CRNP      hydrOXYzine HCL  25 mg Oral Q6H PRN Max 4/day Kary Wilson MD      hydrOXYzine HCL  50 mg Oral Q6H PRN Sabine Maldonado, CRNP      loratadine  10 mg Oral Daily Jennifer Magdalenojazmin, CRNP      LORazepam  1 mg Intramuscular Q6H PRN Max 3/day Kary Wilson MD      nicotine polacrilex  4 mg Oral Q2H PRN Kary Wilson MD      risperiDONE  2 mg Oral BID Andrés Matthews,            Risks / Benefits of Treatment:    Risks, benefits, and possible side effects of medications explained to patient and patient verbalizes understanding and agreement for treatment.    Counseling / Coordination of Care:    Patient's progress discussed with staff in treatment team meeting.  Medications, treatment progress and treatment plan reviewed with patient.    Note Share    This note was not shared with the patient due to reasonable likelihood of causing patient harm    CHADD Alonso 01/13/24

## 2024-01-14 PROCEDURE — 99232 SBSQ HOSP IP/OBS MODERATE 35: CPT | Performed by: STUDENT IN AN ORGANIZED HEALTH CARE EDUCATION/TRAINING PROGRAM

## 2024-01-14 RX ADMIN — LORATADINE 10 MG: 10 TABLET ORAL at 09:39

## 2024-01-14 RX ADMIN — RISPERIDONE 2 MG: 2 TABLET, ORALLY DISINTEGRATING ORAL at 17:30

## 2024-01-14 RX ADMIN — RISPERIDONE 2 MG: 2 TABLET, ORALLY DISINTEGRATING ORAL at 09:38

## 2024-01-14 RX ADMIN — FLUTICASONE PROPIONATE 2 SPRAY: 50 SPRAY, METERED NASAL at 09:39

## 2024-01-14 NOTE — PLAN OF CARE
Problem: Alteration in Thoughts and Perception  Goal: Verbalize thoughts and feelings  Description: Interventions:  - Promote a nonjudgmental and trusting relationship with the patient through active listening and therapeutic communication  - Assess patient's level of functioning, behavior and potential for risk  - Engage patient in 1 on 1 interactions  - Encourage patient to express fears, feelings, frustrations, and discuss symptoms    - [place] patient to reality, help patient recognize reality-based thinking   - Administer medications as ordered and assess for potential side effects  - Provide the patient education related to the signs and symptoms of the illness and desired effects of prescribed medications  Outcome: Progressing  Goal: Agree to be compliant with medication regime, as prescribed and report medication side effects  Description: Interventions:  - Offer appropriate PRN medication and supervise ingestion; conduct AIMS, as needed   Outcome: Progressing  Goal: Recognize dysfunctional thoughts, communicate reality-based thoughts at the time of discharge  Description: Interventions:  - Provide medication and psycho-education to assist patient in compliance and developing insight into his/her illness   Outcome: Progressing  Goal: Complete daily ADLs, including personal hygiene independently, as able  Description: Interventions:  - Observe, teach, and assist patient with ADLS  - Monitor and promote a balance of rest/activity, with adequate nutrition and elimination   Outcome: Progressing     Problem: Ineffective Coping  Goal: Demonstrates healthy coping skills  Outcome: Progressing  Goal: Patient/Family verbalizes awareness of resources  Outcome: Progressing  Goal: Understands least restrictive measures  Description: Interventions:  - Utilize least restrictive behavior  Outcome: Progressing  Goal: Free from restraint events  Description: - Utilize least restrictive measures   - Provide behavioral  interventions   - Redirect inappropriate behaviors   Outcome: Progressing     Problem: Depression  Goal: Treatment Goal: Demonstrate behavioral control of depressive symptoms, verbalize feelings of improved mood/affect, and adopt new coping skills prior to discharge  Outcome: Progressing  Goal: Refrain from harming self  Description: Interventions:  - Monitor patient closely, per order   - Supervise medication ingestion, monitor effects and side effects   Outcome: Progressing  Goal: Refrain from isolation  Description: Interventions:  - Develop a trusting relationship   - Encourage socialization   Outcome: Not Progressing  Goal: Refrain from self-neglect  Outcome: Progressing

## 2024-01-14 NOTE — NURSING NOTE
Patient was isolative to their bed this evening. Calm and friendly during conversation. Denies depression, anxiety, SI, HI, and hallucinations of any kind. Was not scheduled for any night time medications. Staff availability reinforced.

## 2024-01-14 NOTE — PROGRESS NOTES
"Progress Note - Behavioral Health   Olga Bydr 53 y.o. female MRN: [id number]  Unit/Bed#: [facility] 607-02 Encounter: [id number]      Subjective:     Documentation, nursing notes, medication reconciliation, labs, and vitals reviewed. Patient was seen for continuing care and reviewed with treatment team.  No acute events over the past 24 hours. Per nursing report, patient remains in behavioral control, appears appropriate at times and remains perseverative on discharge. No medication changes over the past 24 hours.       On evaluation today, Olga is eager to engage in interview.  She denies symptoms of depression, anxiety, and perceptual disturbances.  She remains discharge focused.  Reports that she is not having any issues with her medications, though she feels they are not needed.  She is adamant that though she does not feel medications are warranted, she will continue to take them post discharge.        Psychiatric ROS:  Behavior over the last 24 hours: unchanged  Sleep: normal \"wonderful\"  Appetite: normal \" food is delicious\"  Medication side effects: No   ROS: all other systems are negative      Mental Status Evaluation:    Appearance:  age appropriate, casually dressed, dressed appropriately, adequate grooming   Behavior:  pleasant, cooperative   Speech:  normal rate, normal volume, normal pitch   Mood:  euthymic   Affect:  overbright   Thought Process:  perseverative   Associations: perseveration   Thought Content:  paranoid ideation, ruminating thoughts   Perceptual Disturbances: no auditory hallucinations, no visual hallucinations   Risk Potential: Suicidal ideation - None at present  Homicidal ideation - None at present  Potential for aggression - No   Sensorium:  oriented to person, place, and time/date   Memory:  recent and remote memory grossly intact   Consciousness:  alert and awake   Attention/Concentration: attention span and concentration are age appropriate   Insight:  poor   Judgment: poor "   Gait/Station: normal gait/station   Motor Activity: no abnormal movements       Vital signs in last 24 hours:    Temp:  [97.5 °F (36.4 °C)-97.8 °F (36.6 °C)] 97.5 °F (36.4 °C)  HR:  [80-91] 82  Resp:  [16] 16  BP: (109-128)/(70-86) 109/81    Laboratory results: I have personally reviewed all pertinent laboratory/tests results    Results from the past 24 hours: No results found for this or any previous visit (from the past 24 hour(s)).      Progress Toward Goals: progressing    Suicide/Homicide Risk Assessment:    Risk of Harm to Self:   Nursing Suicide Risk Assessment Last 24 hours: C-SSRS Risk (Since Last Contact)  Calculated C-SSRS Risk Score (Since Last Contact): No Risk Indicated    Risk of Harm to Others:  Nursing Homicide Risk Assessment: Violence Risk to Others: Denies within past 6 months    Assessment/Plan   Principal Problem:    Schizoaffective disorder (HCC)  Active Problems:    Medical clearance for psychiatric admission    Positive QuantiFERON-TB Gold test      Recommended Treatment:     Planned medication and treatment changes:    All current active medications have been reviewed  Encourage group therapy, milieu therapy and occupational therapy  Behavioral Health checks every 7 minutes  Continue with SLIM medical management as indicated  Disposition planning ongoing  Continue current medications:    Current Facility-Administered Medications   Medication Dose Route Frequency Provider Last Rate    aluminum-magnesium hydroxide-simethicone  30 mL Oral Q4H PRN Kary Wilson MD      amLODIPine  5 mg Oral Daily Eryn Joshi, CHADD      benztropine  1 mg Intramuscular Q4H PRN CHADD Riddle      benztropine  0.5 mg Oral Daily PRN CHADD Riddle      fluticasone  2 spray Each Nare BID CHADD Monreal      haloperidol  2 mg Oral Q4H PRN Max 6/day CHADD Riddle      haloperidol  5 mg Oral Q4H PRN CHADD Riddle      haloperidol  5 mg Oral Q6H PRN CHADD Riddle       haloperidol lactate  2.5 mg Intramuscular Q6H PRN CHADD Riddle      And    LORazepam  1 mg Intravenous Q6H PRN CHADD Riddle      haloperidol lactate  5 mg Intramuscular Q6H PRN CHADD Riddle      And    LORazepam  2 mg Intravenous Q6H PRN Sabine Maldonado, CHADD      hydrOXYzine HCL  25 mg Oral Q6H PRN Max 4/day Kary Wilson MD      hydrOXYzine HCL  50 mg Oral Q6H PRN CHADD Riddle      loratadine  10 mg Oral Daily Jennifer SimsCHADD Short      LORazepam  1 mg Intramuscular Q6H PRN Max 3/day Kary Wilson MD      nicotine polacrilex  4 mg Oral Q2H PRN Kary Wilson MD      risperiDONE  2 mg Oral BID Andrés Matthews DO           Risks / Benefits of Treatment:    Risks, benefits, and possible side effects of medications explained to patient and patient verbalizes understanding and agreement for treatment.    Counseling / Coordination of Care:    Patient's progress discussed with staff in treatment team meeting.  Medications, treatment progress and treatment plan reviewed with patient.    Note Share    This note was not shared with the patient due to reasonable likelihood of causing patient harm    CHADD Alonso 01/14/24

## 2024-01-15 PROCEDURE — 99232 SBSQ HOSP IP/OBS MODERATE 35: CPT | Performed by: PSYCHIATRY & NEUROLOGY

## 2024-01-15 RX ORDER — RISPERIDONE 2 MG/1
2 TABLET, ORALLY DISINTEGRATING ORAL DAILY
Status: DISCONTINUED | OUTPATIENT
Start: 2024-01-16 | End: 2024-01-19

## 2024-01-15 RX ORDER — RISPERIDONE 2 MG/1
2 TABLET, ORALLY DISINTEGRATING ORAL 2 TIMES DAILY
Status: CANCELLED | OUTPATIENT
Start: 2024-01-15

## 2024-01-15 RX ADMIN — RISPERIDONE 2 MG: 2 TABLET, ORALLY DISINTEGRATING ORAL at 08:40

## 2024-01-15 RX ADMIN — LORATADINE 10 MG: 10 TABLET ORAL at 08:40

## 2024-01-15 RX ADMIN — AMLODIPINE BESYLATE 5 MG: 5 TABLET ORAL at 10:07

## 2024-01-15 RX ADMIN — RISPERIDONE 3 MG: 2 TABLET, ORALLY DISINTEGRATING ORAL at 21:16

## 2024-01-15 NOTE — PLAN OF CARE
Pt. Calmer and better able focus to group topics rather than to focus on discharge.Pt. gave her son compliments today on his maturity for coping with his life.Appropri  Problem: Ineffective Coping  Goal: Participates in unit activities  Description: Interventions:  - Provide therapeutic environment   - Provide required programming   - Redirect inappropriate behaviors   Outcome: Progressing   ately social with peers  and attends all groups as assigned.

## 2024-01-15 NOTE — NURSING NOTE
"Patient is withdrawn to room this shift. She told staff that her mother was coming to  her belongings but nobody showed up for them. When asked she kept stating \"She'll be here in a half hour. She couldn't get a ride.\" Belongings returned to security. Patient denied all psych s/s and stated \"I'm okay.\" No medication scheduled this shift. Encouraged to inform staff of any needs or concerns.   "

## 2024-01-15 NOTE — PROGRESS NOTES
01/15/24 0859   Team Meeting   Meeting Type Daily Rounds   Initial Conference Date 01/15/24   Team Members Present   Team Members Present Physician;Nurse;;;Occupational Therapist   Physician Team Member Cassie   Nursing Team Member Richa   Care Management Team Member Hans   Social Work Team Member Anabell ALAN Team Member Sudheer   Patient/Family Present   Patient Present No   Patient's Family Present No     Bright and visible. Withdrawn in her room last night. No behavioral issues. Continues to refuses to take [place]. Discharge is pending.

## 2024-01-15 NOTE — NURSING NOTE
Patient visible and social in the milieu. Patient attended groups and asked writer to print out activities for her. Patient is med compliant, calm, and cooperative. Patient denies psych s/s. Patient preoccupied with discharge asking writer if there were any updates with her going home. Appetite intact. VSS. Continual rounding in place

## 2024-01-15 NOTE — PROGRESS NOTES
01/15/24 1030 01/15/24 1330   Activity/Group Checklist   Group Community meeting Life Skills  (self motivation topic)   Attendance Attended Attended   Attendance Duration (min) 46-60 46-60   Interactions Interacted appropriately Other (Comment)  (P.t able to readjust her thoughts when offered a more realistic sequencing approach to coping with stressors.)   Affect/Mood Appropriate;Calm;Normal range Normal range;Calm;Appropriate   Goals Achieved Able to engage in interactions;Able to listen to others;Discussed coping strategies Able to engage in interactions;Discussed coping strategies

## 2024-01-15 NOTE — PLAN OF CARE
Problem: Alteration in Thoughts and Perception  Goal: Treatment Goal: Gain control of psychotic behaviors/thinking, reduce/eliminate presenting symptoms and demonstrate improved reality functioning upon discharge  Outcome: Progressing  Goal: Verbalize thoughts and feelings  Description: Interventions:  - Promote a nonjudgmental and trusting relationship with the patient through active listening and therapeutic communication  - Assess patient's level of functioning, behavior and potential for risk  - Engage patient in 1 on 1 interactions  - Encourage patient to express fears, feelings, frustrations, and discuss symptoms    - [place] patient to reality, help patient recognize reality-based thinking   - Administer medications as ordered and assess for potential side effects  - Provide the patient education related to the signs and symptoms of the illness and desired effects of prescribed medications  Outcome: Progressing  Goal: Agree to be compliant with medication regime, as prescribed and report medication side effects  Description: Interventions:  - Offer appropriate PRN medication and supervise ingestion; conduct AIMS, as needed   Outcome: Progressing  Goal: Recognize dysfunctional thoughts, communicate reality-based thoughts at the time of discharge  Description: Interventions:  - Provide medication and psycho-education to assist patient in compliance and developing insight into his/her illness   Outcome: Progressing  Goal: Complete daily ADLs, including personal hygiene independently, as able  Description: Interventions:  - Observe, teach, and assist patient with ADLS  - Monitor and promote a balance of rest/activity, with adequate nutrition and elimination   Outcome: Progressing     Problem: Ineffective Coping  Goal: Identifies ineffective coping skills  Outcome: Progressing  Goal: Identifies healthy coping skills  Outcome: Progressing  Goal: Demonstrates healthy coping skills  Outcome: Progressing  Goal:  Patient/Family participate in treatment and DC plans  Description: Interventions:  - Provide therapeutic environment  Outcome: Progressing  Goal: Patient/Family verbalizes awareness of resources  Outcome: Progressing  Goal: Understands least restrictive measures  Description: Interventions:  - Utilize least restrictive behavior  Outcome: Progressing  Goal: Free from restraint events  Description: - Utilize least restrictive measures   - Provide behavioral interventions   - Redirect inappropriate behaviors   Outcome: Progressing
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
## 2024-01-16 PROCEDURE — 99232 SBSQ HOSP IP/OBS MODERATE 35: CPT | Performed by: PSYCHIATRY & NEUROLOGY

## 2024-01-16 RX ADMIN — RISPERIDONE 3 MG: 2 TABLET, ORALLY DISINTEGRATING ORAL at 21:30

## 2024-01-16 RX ADMIN — LORATADINE 10 MG: 10 TABLET ORAL at 09:04

## 2024-01-16 RX ADMIN — RISPERIDONE 2 MG: 2 TABLET, ORALLY DISINTEGRATING ORAL at 09:04

## 2024-01-16 NOTE — PLAN OF CARE
Problem: Alteration in Thoughts and Perception  Goal: Verbalize thoughts and feelings  Description: Interventions:  - Promote a nonjudgmental and trusting relationship with the patient through active listening and therapeutic communication  - Assess patient's level of functioning, behavior and potential for risk  - Engage patient in 1 on 1 interactions  - Encourage patient to express fears, feelings, frustrations, and discuss symptoms    - [place] patient to reality, help patient recognize reality-based thinking   - Administer medications as ordered and assess for potential side effects  - Provide the patient education related to the signs and symptoms of the illness and desired effects of prescribed medications  Outcome: Progressing  Goal: Recognize dysfunctional thoughts, communicate reality-based thoughts at the time of discharge  Description: Interventions:  - Provide medication and psycho-education to assist patient in compliance and developing insight into his/her illness   Outcome: Progressing     Problem: Ineffective Coping  Goal: Identifies healthy coping skills  Outcome: Progressing

## 2024-01-16 NOTE — NURSING NOTE
"Patient took HS Risperidone which was just increased to 3mg however reported to writer in German \"I'll take it today because I want to leave but I will not take it again. The  Walked right past me today. He is letting people go worse than me and for me he is increasing meds without telling me?\" Patient remains focused on discharge  "

## 2024-01-16 NOTE — PROGRESS NOTES
Pt attended  Recovery: wellness and strengths group.  Pt was able to self express and bright.,  Pt expressed gratitude.  Pt able to indicate positive strengths and reflect.  Pt indicate she is a communicator and displayed professional responsibilities of others previously.         01/16/24 1100   Activity/Group Checklist   Group Other (Comment)  ( Recovery: wellness and strengths group)   Attendance Attended   Attendance Duration (min) 31-45   Interactions Interacted appropriately   Affect/Mood Appropriate;Bright   Goals Achieved Identified feelings;Discussed coping strategies;Able to reflect/comment on own behavior;Able to engage in interactions;Able to listen to others;Able to manage/cope with feelings;Verbalized increased hopefulness;Able to self-disclose

## 2024-01-16 NOTE — PROGRESS NOTES
01/16/24 1030 01/16/24 1330   Activity/Group Checklist   Group Community meeting  (topics exprectation/orientation.) Life Skills  (topic self awareness energizers vs draining task.)   Attendance Attended Attended   Attendance Duration (min) 16-30 46-60   Interactions Interacted appropriately  (P.t able to stay on topic reflecting that she had wanted to be ignacio Actress when she was younger yet did not do it,however feels like a responsibie Mother.Pt. minimized stressors that lead to admission.) Interacted appropriately  (Pt. able to state things that energize her and has been assisting staff with changing the unit seasonal decorations.Pt. vague about allowing her son to be informed of her progress however.)   Affect/Mood Appropriate;Calm;Normal range Calm;Appropriate   Goals Achieved Able to listen to others;Able to engage in interactions;Able to self-disclose Able to engage in interactions;Able to listen to others;Discussed coping strategies;Identified feelings

## 2024-01-16 NOTE — PROGRESS NOTES
01/16/24 0851   Team Meeting   Meeting Type Daily Rounds   Initial Conference Date 01/16/24   Team Members Present   Team Members Present Physician;Nurse;;;Occupational Therapist   Physician Team Member Cassie   Nursing Team Member Rosy   Care Management Team Member Hans   Social Work Team Member Anabell   OT Team Member Sudheer   Patient/Family Present   Patient Present No   Patient's Family Present No     Patient upset with recent medication increase, took it this morning but stated she would not again. Still focused on discharge. Discharge is pending.

## 2024-01-16 NOTE — NURSING NOTE
Pt is visible on the unit and interacts with peers. Pt is calm and cooperative with care. Pt declined Flonase and amlodipine but compliant with the rest of the scheduled medications. Pt has good intake at meals time. Pt denies all psych s/s at this time . Will maintain q 7 mins checks.
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
## 2024-01-17 PROCEDURE — 99232 SBSQ HOSP IP/OBS MODERATE 35: CPT | Performed by: PSYCHIATRY & NEUROLOGY

## 2024-01-17 RX ADMIN — RISPERIDONE 3 MG: 2 TABLET, ORALLY DISINTEGRATING ORAL at 21:30

## 2024-01-17 RX ADMIN — LORATADINE 10 MG: 10 TABLET ORAL at 08:50

## 2024-01-17 RX ADMIN — RISPERIDONE 2 MG: 2 TABLET, ORALLY DISINTEGRATING ORAL at 08:50

## 2024-01-17 NOTE — PLAN OF CARE
Problem: Alteration in Thoughts and Perception  Goal: Verbalize thoughts and feelings  Description: Interventions:  - Promote a nonjudgmental and trusting relationship with the patient through active listening and therapeutic communication  - Assess patient's level of functioning, behavior and potential for risk  - Engage patient in 1 on 1 interactions  - Encourage patient to express fears, feelings, frustrations, and discuss symptoms    - [place] patient to reality, help patient recognize reality-based thinking   - Administer medications as ordered and assess for potential side effects  - Provide the patient education related to the signs and symptoms of the illness and desired effects of prescribed medications  Outcome: Progressing  Goal: Complete daily ADLs, including personal hygiene independently, as able  Description: Interventions:  - Observe, teach, and assist patient with ADLS  - Monitor and promote a balance of rest/activity, with adequate nutrition and elimination   Outcome: Progressing     Problem: Ineffective Coping  Goal: Identifies healthy coping skills  Outcome: Progressing  Goal: Demonstrates healthy coping skills  Outcome: Progressing  Goal: Understands least restrictive measures  Description: Interventions:  - Utilize least restrictive behavior  Outcome: Progressing  Goal: Free from restraint events  Description: - Utilize least restrictive measures   - Provide behavioral interventions   - Redirect inappropriate behaviors   Outcome: Progressing     Problem: Depression  Goal: Refrain from harming self  Description: Interventions:  - Monitor patient closely, per order   - Supervise medication ingestion, monitor effects and side effects   Outcome: Progressing  Goal: Refrain from isolation  Description: Interventions:  - Develop a trusting relationship   - Encourage socialization   Outcome: Progressing  Goal: Refrain from self-neglect  Outcome: Progressing     Problem: Anxiety  Goal: Anxiety is at  manageable level  Description: Interventions:  - Assess and monitor patient's anxiety level.   - Monitor for signs and symptoms (heart palpitations, chest pain, shortness of breath, headaches, nausea, feeling jumpy, restlessness, irritable, apprehensive).   - Collaborate with interdisciplinary team and initiate plan and interventions as ordered.  - Fred patient to unit/surroundings  - Explain treatment plan  - Encourage participation in care  - Encourage verbalization of concerns/fears  - Identify coping mechanisms  - Assist in developing anxiety-reducing skills  - Administer/offer alternative therapies  - Limit or eliminate stimulants  Outcome: Progressing     Problem: Alteration in Orientation  Goal: Interact with staff daily  Description: Interventions:  - Assess and re-assess patient's level of orientation  - Engage patient in 1 on 1 interactions, daily, for a minimum of 15 minutes   - Establish rapport/trust with patient   Outcome: Progressing  Goal: Cooperate with recommended testing/procedures  Description: Interventions:  - Determine need for ancillary testing  - Observe for mental status changes  - Implement falls/precaution protocol   Outcome: Progressing

## 2024-01-17 NOTE — TREATMENT TEAM
01/17/24 1100   Activity/Group Checklist   Group Anger management   Attendance Attended;Other (Comment)  (late w/ doctor)   Attendance Duration (min) 31-45   Interactions Other (Comment)  (superficial)   Affect/Mood Incongruent   Goals Achieved Identified feelings;Identified triggers;Identified relapse prevention strategies;Discussed coping strategies;Able to listen to others;Able to engage in interactions;Able to reflect/comment on own behavior;Able to manage/cope with feelings;Verbalized increased hopefulness;Able to self-disclose;Able to recieve feedback

## 2024-01-17 NOTE — PROGRESS NOTES
01/17/24 0917   Team Meeting   Meeting Type Daily Rounds   Initial Conference Date 01/17/24   Team Members Present   Team Members Present Physician;Nurse;;;Occupational Therapist;Other (Discipline and Name)   Physician Team Member Patricia   Nursing Team Member Rosy   Care Management Team Member Hans   Social Work Team Member Anabell   OT Team Member Sudheer   Other (Discipline and Name) Kassandra - Pharmacy   Patient/Family Present   Patient Present No   Patient's Family Present No     Patient is calm and cooperative. Focused on discharge. Wants to speak to Soni and only Soni. Discharge is pending.

## 2024-01-17 NOTE — PROGRESS NOTES
"Progress Note - Behavioral Health   Olga Byrd 53 y.o. female MRN: [id number]  Unit/Bed#: [facility] 607-02 Encounter: [id number]    Assessment/Plan   Principal Problem:    Schizoaffective disorder (HCC)  Active Problems:    Medical clearance for psychiatric admission    Positive QuantiFERON-TB Gold test      Recommended Treatment:   No psychopharmacologic changes at this time. Continue psychiatric medications as prescribed.    Continue with group therapy, milieu therapy and occupational therapy.    Continue frequent safety checks and vitals per unit protocol.  Case discussed with treatment team.  Risks, benefits and possible side effects of Medications: Risks, benefits, and possible side effects of medications have previously been explained. No new medications at this time.    Current Legal Status: 303  ------------------------------------------------------------    Subjective: Per nursing report, Olga has been visible, calm, cooperative on the unit and compliant with scheduled psych medications, though denied amlodipine and flonase. Attending groups.  PRNs: none     Today, Olga was seen and evaluated for continuity of care. On evaluation, patient is pleasant, calm. She reports \"doing great\" this morning. Patient reports no sleep disturbances overnight and an adequate appetite and shares she enjoys the food at the hospital and the options given for meals. Patient expresses concern  regarding her previous CM on the unit being out for a few days and states she has been praying for CM.     Patient denies SI/HI/AVH at this time. Patient reports tolerating current medication regimen without any noted side effects. She is looking forward to attending group after interview. She reports she enjoys going to groups as she feels her better understand others' struggles and feels it gives her a better sense of being able to cope with her own struggles. Patient does inquire about discharge but remains in behavioral control " "and cooperative and willing to allow care team to continue coordinating discharge disposition. Patient denies any further questions or concerns. Patient expresses feeling the daily meetings with care team/psychiatrist have been helpful and makes her feel the team is working to help her.     Progress Toward Goals: slow improvement    Psychiatric Review of Systems:  Behavior over the last 24 hours:  improving slowly  Sleep: normal  Appetite: adequate  Medication side effects: none verbalized  ROS: Complete review of systems is negative except as noted above.    Vital signs in last 24 hours:   Temp:  [97.3 °F (36.3 °C)-98 °F (36.7 °C)] 97.3 °F (36.3 °C)  HR:  [69-77] 71  Resp:  [16-22] 22  BP: (112-116)/(65-82) 114/75    Mental Status Exam:  Appearance:  alert, good eye contact, appears stated age, casually dressed, appropriate grooming and hygiene, and smiling,  female, NAD   Behavior:  calm and cooperative, less guarded, intrusive at times   Motor: no abnormal movements and normal gait and balance   Speech:  spontaneous, clear, slow at times, normal volume, and coherent   Mood:  \"Doing great\"   Affect:  Constricted but more reactive, smiling    Thought Process:  More linear, less circumstantial, less overtly focused on discharge   Thought Content: Less overt paranoia    Perceptual disturbances: no reported hallucinations, appears internally preoccupied at times, though improving    Risk Potential: No active or passive suicidal or homicidal ideation was verbalized during interview   Cognition: oriented to self and situation, appears to be of average intelligence, and cognition not formally tested   Insight:  Limited   Judgment: Limited     Current Medications:  Current Facility-Administered Medications   Medication Dose Route Frequency Provider Last Rate    aluminum-magnesium hydroxide-simethicone  30 mL Oral Q4H PRN Kary Wilson MD      amLODIPine  5 mg Oral Daily CHADD Gregorio      " benztropine  1 mg Intramuscular Q4H PRN Sabine Kendrickey, CRNP      benztropine  0.5 mg Oral Daily PRN Sabine Kendrickey, CRNP      fluticasone  2 spray Each Nare BID Jennifer Martinez, CRNP      haloperidol  2 mg Oral Q4H PRN Max 6/day Sabinefrancesca Kendrickey, CRNP      haloperidol  5 mg Oral Q4H PRN Sabine Kendrickey, CRNP      haloperidol  5 mg Oral Q6H PRN Sabine Kendrickey, CRNP      haloperidol lactate  2.5 mg Intramuscular Q6H PRN Sabine Kendrickey, CRNP      And    LORazepam  1 mg Intravenous Q6H PRN Sabine Kendrickey, CRNP      haloperidol lactate  5 mg Intramuscular Q6H PRN Sabine Kendrickey, CRNP      And    LORazepam  2 mg Intravenous Q6H PRN Sabine Kendrickey, CRNP      hydrOXYzine HCL  25 mg Oral Q6H PRN Max 4/day Kary Wilson MD      hydrOXYzine HCL  50 mg Oral Q6H PRN Sabine Maldonado, CRNP      loratadine  10 mg Oral Daily Jennifer Elliottmyriam, CRNP      LORazepam  1 mg Intramuscular Q6H PRN Max 3/day Kary Wilson MD      nicotine polacrilex  4 mg Oral Q2H PRN Kary Wilson MD      risperiDONE  2 mg Oral Daily Taryn Flowers DO      risperiDONE  3 mg Oral HS Taryn Flowers DO         Behavioral Health Medications: all current active meds have been reviewed. Changes as in plan section above.    Laboratory results:  I have personally reviewed all pertinent laboratory/tests results.  No results found for this or any previous visit (from the past 48 hour(s)).     Taryn Flowers DO

## 2024-01-17 NOTE — NURSING NOTE
Pt is calm and cooperative with care. Pt is visible on the unit with limited peer interactions. Pt declined  Amlodipine and Flonase otherwise took the rest of her scheduled medications. Pt denies all psych s/s at this time . Will maintain q 7 nis checks.

## 2024-01-17 NOTE — NURSING NOTE
"Pt visible throughout milieu this evening interacting with peers and staff. Pt elated stating \"Oh my god, you are so beautiful\" and \"God bless your soul.\" Pt denies all symptoms stating she feels \"great.\" Pt independent for ADLs. Pt able to and encouraged to inform staff of any needs.  "

## 2024-01-18 PROCEDURE — 99232 SBSQ HOSP IP/OBS MODERATE 35: CPT | Performed by: PSYCHIATRY & NEUROLOGY

## 2024-01-18 RX ADMIN — LORATADINE 10 MG: 10 TABLET ORAL at 08:56

## 2024-01-18 RX ADMIN — RISPERIDONE 3 MG: 2 TABLET, ORALLY DISINTEGRATING ORAL at 21:10

## 2024-01-18 RX ADMIN — RISPERIDONE 2 MG: 2 TABLET, ORALLY DISINTEGRATING ORAL at 08:55

## 2024-01-18 NOTE — CASE MANAGEMENT
Call made to [facility], informed Dr Rojas did not tell pt to stop medications. Pt went to appt with Dr Rojas and informed him that pt did not need medications. CM informed pt scheduled for appt with therapist Ty Kam on Tue 1/23 at 11 AM and Dr Rojas on Wed 1/24.     Cm met with pt, reviewed information provided by [facility] and appts. Pt reports agreement with follow up care and to cont with medications as prescribed. Pt requested meds to be sent to [facility] on [facility]. Cm informed pt of plan for d/c Monday.    CM spoke with pt's mother Savanna, informed her of plan for pt's d/c Monday. CM also reviewed pt's appts and need for pt to cont medications. Pt's mother in agreement and will  pt Monday at 12 pm.

## 2024-01-18 NOTE — NURSING NOTE
Patient was visible and social with select peers in the milieu. Denies all psych s/s. No complaints offered. No behaviors noted. Had 100% for both breakfast/lunch. Took her medications, declined Flonase, Norvasc was held due to parameter. Safety checks ongoing.

## 2024-01-18 NOTE — PROGRESS NOTES
01/18/24 0922   Team Meeting   Meeting Type Daily Rounds   Initial Conference Date 01/18/24   Team Members Present   Team Members Present Physician;Nurse;;Occupational Therapist   Physician Team Member Cassie/Erica   Nursing Team Member Rosy   Care Management Team Member Hans   OT Team Member Sudheer   Patient/Family Present   Patient Present No   Patient's Family Present No     Visible but also irritable and guarded. Became very argumentative with staff when asked for a mouth check. She is fine in groups and she participates. No longer as fixated on leaving. Discharge is pending.

## 2024-01-18 NOTE — PROGRESS NOTES
"Progress Note - Behavioral Health   Olga Byrd 53 y.o. female MRN: [id number]  Unit/Bed#: [facility] 607-02 Encounter: [id number]    Assessment/Plan   Principal Problem:    Schizoaffective disorder (HCC)  Active Problems:    Medical clearance for psychiatric admission    Positive QuantiFERON-TB Gold test      Recommended Treatment:   No psychopharmacologic changes at this time. Continue psychiatric medications at this time.     Continue with group therapy, milieu therapy and occupational therapy.    Continue frequent safety checks and vitals per unit protocol.  Case discussed with treatment team.  Risks, benefits and possible side effects of Medications: Risks, benefits, and possible side effects of medications have been explained to the patient, who verbalizes understanding    Current Legal Status: 303  Disposition: tentative DC for next week  ------------------------------------------------------------    Subjective: Per nursing report, Olga has been guarded, appeared offended when asked to open mouth after med administration and stated \"I am a trustworthy person, I won't hide my meds\". Social with select peers on the unit and compliant with scheduled medications.  PRNs: none      Today, Olga was seen and evaluated for continuity of care. She reports feeling \"well\" this morning. Patient reports an estimated 9 hours of sleep overnight and an adequate appetite. She describes her energy levels are \"normal\" and has been walking laps on the unit as a way of getting exercise. She brightens up when talking about her children. Patient states she is looking to return home as her mother has doctor's appointments to attend but is willing to work with team.      Patient denies SI/HI/AVH at this time. Patient reports tolerating current medication regimen without any noted side effects and feels \"so calm\" and feeling \"really good\" with the medications,     Progress Toward Goals: slow improvement    Psychiatric Review of " "Systems:  Behavior over the last 24 hours:  slowly improving   Sleep: normal  Appetite: adequate  Medication side effects: none verbalized  ROS: Complete review of systems is negative except as noted above.    Vital signs in last 24 hours:   Temp:  [97.1 °F (36.2 °C)-98.6 °F (37 °C)] 97.1 °F (36.2 °C)  HR:  [66-87] 87  Resp:  [18] 18  BP: (108-124)/(67-82) 113/82    Mental Status Exam:  Appearance:  alert, good eye contact, appears stated age, casually dressed, appropriate grooming and hygiene, braided hair, and smiling, NAD   Behavior:  calm, cooperative, and guarded at times   Motor: no abnormal movements and normal gait and balance   Speech:  spontaneous, clear, normal rate, normal volume, and coherent   Mood:  \"well\"   Affect:  constricted and more reactive, smiling at times    Thought Process:  More linear    Thought Content: Less overt paranoia, focused on discharge    Perceptual disturbances: no reported hallucinations and does not appear to be responding to internal stimuli at this time; appears less internally preoccupied    Risk Potential: No active or passive suicidal or homicidal ideation was verbalized during interview   Cognition: oriented to self and situation, appears to be of average intelligence, and cognition not formally tested   Insight:  Limited   Judgment: Improving     Current Medications:  Current Facility-Administered Medications   Medication Dose Route Frequency Provider Last Rate    aluminum-magnesium hydroxide-simethicone  30 mL Oral Q4H PRN Kary Wilson MD      amLODIPine  5 mg Oral Daily CHADD Gregorio      benztropine  1 mg Intramuscular Q4H PRN CHADD Riddle      benztropine  0.5 mg Oral Daily PRN CHADD Riddle      fluticasone  2 spray Each Nare BID CHADD Monreal      haloperidol  2 mg Oral Q4H PRN Max 6/day CHADD Riddle      haloperidol  5 mg Oral Q4H PRN CHADD Riddle      haloperidol  5 mg Oral Q6H PRN CHADD Riddle      " haloperidol lactate  2.5 mg Intramuscular Q6H PRN Sabine Kendrickey, CRNP      And    LORazepam  1 mg Intravenous Q6H PRN Sabinefrancesca Kendrickey, CRNP      haloperidol lactate  5 mg Intramuscular Q6H PRN Sabine Aroldoey, CRNP      And    LORazepam  2 mg Intravenous Q6H PRN Sabine Hangey, CRNP      hydrOXYzine HCL  25 mg Oral Q6H PRN Max 4/day Kary Wilson MD      hydrOXYzine HCL  50 mg Oral Q6H PRN Sabinefrancesca Kendrickey, CRNP      loratadine  10 mg Oral Daily Jennifer Braden Michelle, CRNP      LORazepam  1 mg Intramuscular Q6H PRN Max 3/day Kary Wilson MD      nicotine polacrilex  4 mg Oral Q2H PRN Kary Wilson MD      risperiDONE  2 mg Oral Daily Taryn Flowers DO      risperiDONE  3 mg Oral HS Taryn Flowers DO         Behavioral Health Medications: all current active meds have been reviewed. Changes as in plan section above.    Laboratory results:  I have personally reviewed all pertinent laboratory/tests results.  No results found for this or any previous visit (from the past 48 hour(s)).     Taryn Flowers DO

## 2024-01-18 NOTE — PLAN OF CARE
Problem: Alteration in Thoughts and Perception  Goal: Treatment Goal: Gain control of psychotic behaviors/thinking, reduce/eliminate presenting symptoms and demonstrate improved reality functioning upon discharge  Outcome: Progressing  Goal: Verbalize thoughts and feelings  Description: Interventions:  - Promote a nonjudgmental and trusting relationship with the patient through active listening and therapeutic communication  - Assess patient's level of functioning, behavior and potential for risk  - Engage patient in 1 on 1 interactions  - Encourage patient to express fears, feelings, frustrations, and discuss symptoms    - Winside patient to reality, help patient recognize reality-based thinking   - Administer medications as ordered and assess for potential side effects  - Provide the patient education related to the signs and symptoms of the illness and desired effects of prescribed medications  Outcome: Progressing     Problem: Ineffective Coping  Goal: Identifies healthy coping skills  Outcome: Progressing     Problem: Depression  Goal: Treatment Goal: Demonstrate behavioral control of depressive symptoms, verbalize feelings of improved mood/affect, and adopt new coping skills prior to discharge  Outcome: Progressing  Goal: Refrain from harming self  Description: Interventions:  - Monitor patient closely, per order   - Supervise medication ingestion, monitor effects and side effects   Outcome: Progressing

## 2024-01-18 NOTE — NURSING NOTE
"Pt isolated to room this shift. Pt guarded and edgy when being assessed and denied all psych symptoms. Pt stated \"I'm good.\" Pt med/meal compliant. When asked to open mouth following med administration, pt appeared offended stating \"I am a trustworthy person, I won't hide my meds.\" Pt independent for ADLs. Pt able to and encouraged to inform staff of any needs.  "

## 2024-01-18 NOTE — CASE MANAGEMENT
Cm spoke with pt's mother Savanna who reports pt has been improved on the phone although pt cont to report having a cold. Pt's mother reports she is still with pt's children.

## 2024-01-18 NOTE — PLAN OF CARE
Pt. Attending all groups as scheduled and is displaying enjoyment in peer interaction.Does express a desire to be discharged.  Problem: Alteration in Thoughts and Perception  Goal: Attend and participate in unit activities, including therapeutic, recreational, and educational groups  Description: Interventions:  -Encourage Visitation and family involvement in care  Outcome: Progressing

## 2024-01-18 NOTE — PROGRESS NOTES
01/17/24 1015 01/17/24 1330   Activity/Group Checklist   Group Community meeting Exercise   Attendance Attended Attended   Attendance Duration (min) 31-45 46-60   Interactions Interacted appropriately Interacted appropriately   Affect/Mood Appropriate;Calm;Normal range Appropriate;Bright;Normal range   Goals Achieved Able to engage in interactions;Discussed coping strategies Able to engage in interactions;Able to listen to others

## 2024-01-18 NOTE — PROGRESS NOTES
01/18/24 1030 01/18/24 1330   Activity/Group Checklist   Group Community meeting  (memory games.) Life Skills  (stress  bingo)   Attendance Attended Attended   Attendance Duration (min) 31-45 46-60   Interactions Interacted appropriately Interacted appropriately   Affect/Mood Bright;Appropriate;Calm;Normal range Appropriate;Calm;Normal range   Goals Achieved Able to engage in interactions Able to engage in interactions;Discussed coping strategies

## 2024-01-19 PROCEDURE — 99232 SBSQ HOSP IP/OBS MODERATE 35: CPT | Performed by: PSYCHIATRY & NEUROLOGY

## 2024-01-19 RX ADMIN — RISPERIDONE 2 MG: 2 TABLET, ORALLY DISINTEGRATING ORAL at 08:44

## 2024-01-19 RX ADMIN — LORATADINE 10 MG: 10 TABLET ORAL at 08:44

## 2024-01-19 RX ADMIN — RISPERIDONE 3 MG: 2 TABLET, ORALLY DISINTEGRATING ORAL at 21:11

## 2024-01-19 NOTE — PROGRESS NOTES
"Progress Note - Behavioral Health   Olga Byrd 53 y.o. female MRN: [id number]  Unit/Bed#: [facility] 607-02 Encounter: [id number]    Assessment/Plan   Principal Problem:    Schizoaffective disorder (HCC)  Active Problems:    Medical clearance for psychiatric admission    Positive QuantiFERON-TB Gold test      Recommended Treatment:   Increase Risperdal to 3 mg BID for psychosis.    Continue with group therapy, milieu therapy and occupational therapy.    Continue frequent safety checks and vitals per unit protocol.  Case discussed with treatment team.  Risks, benefits and possible side effects of Medications: Risks, benefits, and possible side effects of medications have been explained to the patient, who verbalizes understanding    Current Legal Status: 303  Disposition: tentative DC Monday  ------------------------------------------------------------    Subjective: Per nursing report, Olga has been cooperative on the unit and compliant with scheduled medications. Attending all groups and appears to enjoy peer interactions.  PRNs: none      Today, Olga was seen and evaluated for continuity of care. Patient is seen laying in bed prior to interview this morning and sits up in bed upon approach. She reports she was \"just resting\" this morning prior to morning group. She denies any sleep disturbances overnight and reports an adequate appetite. Reports adequate energy levels and continues to get physical activity by walking on the unit. Patient aware of upcoming discharge and able to verbalize dates or follow up appts. Patient reports she plans to attend group after interview.    Patient denies SI/HI/AVH at this time. Patient reports tolerating current medication regimen without any noted side effects. She is agreeable to increasing Risperdal to 3 mg BID. Denies any questions or concerns at this time.     Progress Toward Goals: slow improvement    Psychiatric Review of Systems:  Behavior over the last 24 hours:  " "improving  Sleep: normal  Appetite: adequate  Medication side effects: none verbalized  ROS: Complete review of systems is negative except as noted above.    Vital signs in last 24 hours:   Temp:  [97.2 °F (36.2 °C)-98.1 °F (36.7 °C)] 98.1 °F (36.7 °C)  HR:  [61-89] 61  Resp:  [16-18] 16  BP: (107-134)/(67-79) 134/79    Mental Status Exam:  Appearance:  alert, good eye contact, appears stated age, casually dressed, and appropriate grooming and hygiene, NAD    Behavior:  calm, cooperative, and sitting comfortably   Motor: no abnormal movements and normal gait and balance   Speech:  spontaneous, clear, normal rate, normal volume, and coherent   Mood:  \"Just resting\"   Affect:  Constricted but reactive    Thought Process:  Linear   Thought Content: no verbalized delusions or overt paranoia   Perceptual disturbances: no reported hallucinations and does not appear to be responding to internal stimuli at this time   Risk Potential: No active or passive suicidal or homicidal ideation was verbalized during interview   Cognition: oriented to self and situation, appears to be of average intelligence, and cognition not formally tested   Insight:  Limited   Judgment: Improving     Current Medications:  Current Facility-Administered Medications   Medication Dose Route Frequency Provider Last Rate    aluminum-magnesium hydroxide-simethicone  30 mL Oral Q4H PRN Kary Wilson MD      benztropine  1 mg Intramuscular Q4H PRN CHADD Riddle      benztropine  0.5 mg Oral Daily PRN CHADD Riddle      fluticasone  2 spray Each Nare BID CHADD Monreal      haloperidol  2 mg Oral Q4H PRN Max 6/day CHADD Riddle      haloperidol  5 mg Oral Q4H PRN CHADD Riddle      haloperidol  5 mg Oral Q6H PRN CHADD Riddle      haloperidol lactate  2.5 mg Intramuscular Q6H PRN CHADD Riddle      And    LORazepam  1 mg Intravenous Q6H PRN CHADD Riddle      haloperidol lactate  5 mg Intramuscular Q6H " PRN CHADD Riddle      And    LORazepam  2 mg Intravenous Q6H PRN CHADD Riddle      hydrOXYzine HCL  25 mg Oral Q6H PRN Max 4/day Kary Wilson MD      hydrOXYzine HCL  50 mg Oral Q6H PRN CHADD Riddle      loratadine  10 mg Oral Daily Jennifer Braden CHADD Martinez      LORazepam  1 mg Intramuscular Q6H PRN Max 3/day Kary Wilson MD      nicotine polacrilex  4 mg Oral Q2H PRN Kary Wilson MD      risperiDONE  3 mg Oral HS Taryn Flowers DO      [START ON 1/20/2024] risperiDONE  3 mg Oral Daily Taryn Flowers DO         Behavioral Health Medications: all current active meds have been reviewed. Changes as in plan section above.    Laboratory results:  I have personally reviewed all pertinent laboratory/tests results.  No results found for this or any previous visit (from the past 48 hour(s)).     Taryn Flowers DO

## 2024-01-19 NOTE — PROGRESS NOTES
01/19/24 1015 01/19/24 1300   Activity/Group Checklist   Group Community meeting  (topic refuting negative thoughts.) Wellness  (guided imagery ocean walk.)   Attendance Attended Attended   Attendance Duration (min) 16-30 31-45   Interactions Interacted appropriately  (Pt. pleasant and did not share any negative thoughts other than a desire to go home next week.) Interacted appropriately  (pt reported feeling grateful for the groups)   Affect/Mood Calm Appropriate;Calm;Normal range   Goals Achieved Able to engage in interactions Able to engage in interactions;Able to listen to others;Discussed coping strategies

## 2024-01-19 NOTE — PLAN OF CARE
Problem: Ineffective Coping  Goal: Identifies healthy coping skills  Outcome: Progressing     Problem: Depression  Goal: Refrain from harming self  Description: Interventions:  - Monitor patient closely, per order   - Supervise medication ingestion, monitor effects and side effects   Outcome: Progressing  Goal: Refrain from self-neglect  Outcome: Progressing     Problem: Anxiety  Goal: Anxiety is at manageable level  Description: Interventions:  - Assess and monitor patient's anxiety level.   - Monitor for signs and symptoms (heart palpitations, chest pain, shortness of breath, headaches, nausea, feeling jumpy, restlessness, irritable, apprehensive).   - Collaborate with interdisciplinary team and initiate plan and interventions as ordered.  - Diagonal patient to unit/surroundings  - Explain treatment plan  - Encourage participation in care  - Encourage verbalization of concerns/fears  - Identify coping mechanisms  - Assist in developing anxiety-reducing skills  - Administer/offer alternative therapies  - Limit or eliminate stimulants  Outcome: Progressing     Problem: Alteration in Orientation  Goal: Interact with staff daily  Description: Interventions:  - Assess and re-assess patient's level of orientation  - Engage patient in 1 on 1 interactions, daily, for a minimum of 15 minutes   - Establish rapport/trust with patient   Outcome: Progressing

## 2024-01-19 NOTE — NURSING NOTE
Patient visible and social on unit. Patient has bright affect. Denies psych s/s. Patient excited to know she is to be discharge Monday. Patient asked writer to find out what time her discharge would be so she can make sure she has a ride. Patient is med compliant. Denies unmet needs. No Prn's given. Continual rounding in place

## 2024-01-19 NOTE — PROGRESS NOTES
01/19/24 0904   Team Meeting   Meeting Type Daily Rounds   Initial Conference Date 01/19/24   Next Conference Date 01/22/24   Team Members Present   Team Members Present Physician;Nurse;;Occupational Therapist   Physician Team Member WIN Mcgarry, Dr Flowers   Nursing Team Member Jose   Care Management Team Member Jing ALAN Team Member Sudheer   Patient/Family Present   Patient Present No   Patient's Family Present No     Strange with mouth checks, still wants to discharge on Monday. Mother will .

## 2024-01-20 PROCEDURE — 99232 SBSQ HOSP IP/OBS MODERATE 35: CPT

## 2024-01-20 RX ADMIN — RISPERIDONE 3 MG: 2 TABLET, ORALLY DISINTEGRATING ORAL at 21:31

## 2024-01-20 RX ADMIN — RISPERIDONE 3 MG: 2 TABLET, ORALLY DISINTEGRATING ORAL at 08:56

## 2024-01-20 RX ADMIN — LORATADINE 10 MG: 10 TABLET ORAL at 08:56

## 2024-01-20 NOTE — PROGRESS NOTES
"Progress Note - Behavioral Health   Olga yBrd 53 y.o. female MRN: [id number]  Unit/Bed#: [facility] 607-02 Encounter: [id number]    Patient was seen today for continuation of care, records reviewed and patient was discussed with the morning case review team.   Per staff, patient is calm, pleasant and cooperative on the unit.  She is meal and medication compliant.  No acute behaviors.      Olga was seen today for psychiatric follow-up.  On assessment today, Olga was seen resting in bed in her room.  Olga brightened on approach and presents as overall euthymic.  She reports that she is \"perfect\" today.  She continues to lack insight into behaviors leading into the hospital.  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  She reports she is eating and sleeping well.  Agreeable to further titration of medications. Tolerating Risperdal.    Olga denies acute suicidal/self-harm ideation/intent/plan upon direct inquiry at this time.  Olga remains behaviorally appropriate, no agitation or aggression noted on exam or in report.  Olga also denies HI/AH/VH, and does not appear overtly manic nor does she present as internally preoccupied.    Olga remains adherent to her current psychotropic medication regimen and denies any side effects from medications, as well as none noted on exam.    Recommended Treatment: Treatment plan and medication changes discussed and per the attending physician the plan is:    1.Continue with group therapy, milieu therapy and occupational therapy  2.Behavioral Health checks every 7 minutes  3.Continue frequent safety checks and vitals per unit protocol  4.Continue with SLIM medical management as indicated  5.Continue with current medication regimen. Risperdal being increased to 3mg daily and 3mg PO at HS tonight.   6.Will review labs in the a.m.  7.Disposition Planning: Discharge planning and efforts remain ongoing    Vitals:  Vitals: "    01/20/24 0752   BP: 111/66   Pulse: 57   Resp: 15   Temp: (!) 97.1 °F (36.2 °C)   SpO2: 95%       Laboratory Results:  I have personally reviewed all pertinent laboratory/tests results.  Most Recent Labs:   Lab Results   Component Value Date    WBC 7.95 01/05/2024    RBC 5.59 (H) 01/05/2024    HGB 15.7 (H) 01/05/2024    HCT 48.8 (H) 01/05/2024     01/05/2024    RDW 14.3 01/05/2024    NEUTROABS 5.47 01/05/2024    SODIUM 140 01/05/2024    K 3.5 01/05/2024     01/05/2024    CO2 28 01/05/2024    BUN 13 01/05/2024    CREATININE 0.89 01/05/2024    GLUC 89 01/05/2024    GLUF 89 01/05/2024    CALCIUM 9.7 01/05/2024    AST 22 01/04/2024    ALT 14 01/04/2024    ALKPHOS 67 01/04/2024    TP 7.2 01/04/2024    ALB 4.2 01/04/2024    TBILI 0.35 01/04/2024    CHOLESTEROL 198 01/05/2024    HDL 81 01/05/2024    TRIG 47 01/05/2024    LDLCALC 108 (H) 01/05/2024    NONHDLC 117 01/05/2024    [id number] 5.929 (H) 01/04/2024       Psychiatric Review of Systems:  Behavior over the last 24 hours:  some improvement.   Sleep: adequate  Appetite: adequate  Medication side effects: denies  ROS: no complaints, denies shortness of breath or chest pain and all other systems are negative for acute changes    Mental Status Evaluation:    Appearance:  casually dressed, adequate grooming, looks stated age   Behavior:  pleasant, cooperative, calm   Speech:  normal rate and volume, fluent, clear   Mood:  euthymic   Affect:  brighter   Thought Process:  linear   Associations: intact associations   Thought Content:  paranoid ideation   Perceptual Disturbances: denies auditory or visual hallucinations when asked, does not appear responding to internal stimuli   Risk Potential: Suicidal ideation - None  Homicidal ideation - None  Potential for aggression - No   Sensorium:  oriented to person and situation   Memory:  recent memory intact   Consciousness:  alert and awake   Attention/Concentration: attention span and concentration are age  appropriate   Insight:  limited   Judgment: limited   Gait/Station: normal gait/station   Motor Activity: no abnormal movements     Progress Toward Goals:   Ogla is progressing towards goals of inpatient psychiatric treatment by continued medication compliance and is attending therapeutic modalities on the milieu. However, the patient continues to require inpatient psychiatric hospitalization for continued medication management and titration to optimize symptom reduction, improve sleep hygiene, and demonstrate adequate self-care.    Risk of Harm to Self:   Nursing Suicide Risk Assessment Last 24 hours: C-SSRS Risk (Since Last Contact)  Calculated C-SSRS Risk Score (Since Last Contact): No Risk Indicated  Current Specific Risk Factors include: diagnosis of mood disorder  Protective Factors: no current suicidal ideation, ability to communicate with staff on the unit, able to contract for safety on the unit  Based on today's assessment, Olga presents the following risk of harm to self: low    Risk of Harm to Others:  Nursing Homicide Risk Assessment: Violence Risk to Others: Denies within past 6 months  Current Specific Risk Factors include: none  Protective Factors: no current homicidal ideation  Based on today's assessment, Olga presents the following risk of harm to others: minimal    The following interventions are recommended: behavioral checks every 7 minutes, continued hospitalization on locked unit      Assessment/Plan   Principal Problem:    Schizoaffective disorder (HCC)  Active Problems:    Medical clearance for psychiatric admission    Positive QuantiFERON-TB Gold test        Behavioral Health Medications: all current active meds have been reviewed and continue current psychiatric medications.  Current Facility-Administered Medications   Medication Dose Route Frequency Provider Last Rate    aluminum-magnesium hydroxide-simethicone  30 mL Oral Q4H PRN Kary Wilson MD      benztropine  1 mg  Intramuscular Q4H PRN Sabine Kendrickey, CRNP      benztropine  0.5 mg Oral Daily PRN Sabine Kendrickey, CRNP      fluticasone  2 spray Each Nare BID Jennifer Elliottmyriam, CRNP      haloperidol  2 mg Oral Q4H PRN Max 6/day Sabinefrancesca Kendrickey, CRNP      haloperidol  5 mg Oral Q4H PRN Sabine Kendrickey, CRNP      haloperidol  5 mg Oral Q6H PRN Sabine Kendrickey, CRNP      haloperidol lactate  2.5 mg Intramuscular Q6H PRN Sabinefrancesca Kendrickey, CRNP      And    LORazepam  1 mg Intravenous Q6H PRN Sabine Hangey, CRNP      haloperidol lactate  5 mg Intramuscular Q6H PRN Sabine Kendrickey, CRNP      And    LORazepam  2 mg Intravenous Q6H PRN Sabine Kendrickey, CRNP      hydrOXYzine HCL  25 mg Oral Q6H PRN Max 4/day Kary Wilson MD      hydrOXYzine HCL  50 mg Oral Q6H PRN Sabine Maldonado, CRNP      loratadine  10 mg Oral Daily Jennifer Simspierce Martinez, CHADD      LORazepam  1 mg Intramuscular Q6H PRN Max 3/day Kary Wilson MD      nicotine polacrilex  4 mg Oral Q2H PRN Kary Wilson MD      risperiDONE  3 mg Oral HS Taryn Flowers DO      risperiDONE  3 mg Oral Daily Taryn Flowers DO         Risks / Benefits of Treatment:  Risks, benefits, and possible side effects of medications explained to patient and patient verbalizes understanding and agreement for treatment.    Counseling / Coordination of Care:  Patient's progress reviewed with nursing staff.  Medications, treatment progress and treatment plan reviewed with patient.  Supportive counseling provided to the patient.    Total floor/unit time spent today 25 minutes. Greater than 50% of total time was spent with the patient and / or family counseling and / or coordination of care. A description of the counseling / coordination of care: medication education, treatment plan, supportive therapy.

## 2024-01-20 NOTE — PLAN OF CARE
Problem: Alteration in Thoughts and Perception  Goal: Treatment Goal: Gain control of psychotic behaviors/thinking, reduce/eliminate presenting symptoms and demonstrate improved reality functioning upon discharge  Outcome: Progressing  Goal: Verbalize thoughts and feelings  Description: Interventions:  - Promote a nonjudgmental and trusting relationship with the patient through active listening and therapeutic communication  - Assess patient's level of functioning, behavior and potential for risk  - Engage patient in 1 on 1 interactions  - Encourage patient to express fears, feelings, frustrations, and discuss symptoms    - [place] patient to reality, help patient recognize reality-based thinking   - Administer medications as ordered and assess for potential side effects  - Provide the patient education related to the signs and symptoms of the illness and desired effects of prescribed medications  Outcome: Progressing  Goal: Refrain from acting on delusional thinking/internal stimuli  Description: Interventions:  - Monitor patient closely, per order   - Utilize least restrictive measures   - Set reasonable limits, give positive feedback for acceptable   - Administer medications as ordered and monitor of potential side effects  Outcome: Progressing  Goal: Agree to be compliant with medication regime, as prescribed and report medication side effects  Description: Interventions:  - Offer appropriate PRN medication and supervise ingestion; conduct AIMS, as needed   Outcome: Progressing  Goal: Attend and participate in unit activities, including therapeutic, recreational, and educational groups  Description: Interventions:  -Encourage Visitation and family involvement in care  Outcome: Progressing  Goal: Recognize dysfunctional thoughts, communicate reality-based thoughts at the time of discharge  Description: Interventions:  - Provide medication and psycho-education to assist patient in compliance and developing  insight into his/her illness   Outcome: Progressing  Goal: Complete daily ADLs, including personal hygiene independently, as able  Description: Interventions:  - Observe, teach, and assist patient with ADLS  - Monitor and promote a balance of rest/activity, with adequate nutrition and elimination   Outcome: Progressing     Problem: Depression  Goal: Treatment Goal: Demonstrate behavioral control of depressive symptoms, verbalize feelings of improved mood/affect, and adopt new coping skills prior to discharge  Outcome: Progressing  Goal: Refrain from harming self  Description: Interventions:  - Monitor patient closely, per order   - Supervise medication ingestion, monitor effects and side effects   Outcome: Progressing  Goal: Refrain from isolation  Description: Interventions:  - Develop a trusting relationship   - Encourage socialization   Outcome: Progressing  Goal: Refrain from self-neglect  Outcome: Progressing     Problem: Anxiety  Goal: Anxiety is at manageable level  Description: Interventions:  - Assess and monitor patient's anxiety level.   - Monitor for signs and symptoms (heart palpitations, chest pain, shortness of breath, headaches, nausea, feeling jumpy, restlessness, irritable, apprehensive).   - Collaborate with interdisciplinary team and initiate plan and interventions as ordered.  - [place] patient to unit/surroundings  - Explain treatment plan  - Encourage participation in care  - Encourage verbalization of concerns/fears  - Identify coping mechanisms  - Assist in developing anxiety-reducing skills  - Administer/offer alternative therapies  - Limit or eliminate stimulants  Outcome: Progressing     Problem: Alteration in Orientation  Goal: Treatment Goal: Demonstrate a reduction of confusion and improved orientation to person, place, time and/or situation upon discharge, according to optimum baseline/ability  Outcome: Progressing  Goal: Interact with staff daily  Description: Interventions:  - Assess  and re-assess patient's level of orientation  - Engage patient in 1 on 1 interactions, daily, for a minimum of 15 minutes   - Establish rapport/trust with patient   Outcome: Progressing  Goal: Allow medical examinations, as recommended  Description: Interventions:  - Provide physical/neurological exams and/or referrals, per provider   Outcome: Progressing  Goal: Cooperate with recommended testing/procedures  Description: Interventions:  - Determine need for ancillary testing  - Observe for mental status changes  - Implement falls/precaution protocol   Outcome: Progressing     Problem: DISCHARGE PLANNING - CARE MANAGEMENT  Goal: Discharge to post-acute care or home with appropriate resources  Description: INTERVENTIONS:  - Conduct assessment to determine patient/family and health care team treatment goals, and need for post-acute services based on payer coverage, community resources, and patient preferences, and barriers to discharge  - Address psychosocial, clinical, and financial barriers to discharge as identified in assessment in conjunction with the patient/family and health care team  - Arrange appropriate level of post-acute services according to patient’s   needs and preference and payer coverage in collaboration with the physician and health care team  - Communicate with and update the patient/family, physician, and health care team regarding progress on the discharge plan  - Arrange appropriate transportation to post-acute venues  Outcome: Progressing

## 2024-01-20 NOTE — NURSING NOTE
Pt is alert, calm, pleasant and cooperative. Bright mood, compliant with medication and care. Visible on the milieu, interaction with selected peers. Pt denies pain, SI/HI, AH, VH.

## 2024-01-20 NOTE — NURSING NOTE
Patient pleasant, calm and cooperative with care. Brightens upon approach. Medication compliant. Patient is visible walking on the milieu during shift. Patient denies any needs at this time. Safety checks ongoing.

## 2024-01-21 PROCEDURE — 99232 SBSQ HOSP IP/OBS MODERATE 35: CPT | Performed by: PSYCHIATRY & NEUROLOGY

## 2024-01-21 RX ADMIN — RISPERIDONE 3 MG: 2 TABLET, ORALLY DISINTEGRATING ORAL at 21:14

## 2024-01-21 RX ADMIN — RISPERIDONE 3 MG: 2 TABLET, ORALLY DISINTEGRATING ORAL at 08:32

## 2024-01-21 RX ADMIN — LORATADINE 10 MG: 10 TABLET ORAL at 08:32

## 2024-01-21 NOTE — PLAN OF CARE
Problem: Alteration in Thoughts and Perception  Goal: Treatment Goal: Gain control of psychotic behaviors/thinking, reduce/eliminate presenting symptoms and demonstrate improved reality functioning upon discharge  1/21/2024 0213 by Cy Saba RN  Outcome: Progressing  1/21/2024 0147 by Cy Saba RN  Outcome: Progressing  Goal: Verbalize thoughts and feelings  Description: Interventions:  - Promote a nonjudgmental and trusting relationship with the patient through active listening and therapeutic communication  - Assess patient's level of functioning, behavior and potential for risk  - Engage patient in 1 on 1 interactions  - Encourage patient to express fears, feelings, frustrations, and discuss symptoms    - [place] patient to reality, help patient recognize reality-based thinking   - Administer medications as ordered and assess for potential side effects  - Provide the patient education related to the signs and symptoms of the illness and desired effects of prescribed medications  1/21/2024 0213 by Cy Saba RN  Outcome: Progressing  1/21/2024 0147 by Cy Saba RN  Outcome: Progressing  Goal: Refrain from acting on delusional thinking/internal stimuli  Description: Interventions:  - Monitor patient closely, per order   - Utilize least restrictive measures   - Set reasonable limits, give positive feedback for acceptable   - Administer medications as ordered and monitor of potential side effects  1/21/2024 0213 by Cy Saba RN  Outcome: Progressing  1/21/2024 0147 by Cy Saba RN  Outcome: Progressing  Goal: Agree to be compliant with medication regime, as prescribed and report medication side effects  Description: Interventions:  - Offer appropriate PRN medication and supervise ingestion; conduct AIMS, as needed   1/21/2024 0213 by Cy Saba RN  Outcome: Progressing  1/21/2024 0147 by Cy Saba RN  Outcome: Progressing  Goal: Recognize dysfunctional  thoughts, communicate reality-based thoughts at the time of discharge  Description: Interventions:  - Provide medication and psycho-education to assist patient in compliance and developing insight into his/her illness   1/21/2024 0213 by Cy Saba RN  Outcome: Progressing  1/21/2024 0147 by Cy Saba RN  Outcome: Progressing  Goal: Complete daily ADLs, including personal hygiene independently, as able  Description: Interventions:  - Observe, teach, and assist patient with ADLS  - Monitor and promote a balance of rest/activity, with adequate nutrition and elimination   1/21/2024 0213 by Cy Saba RN  Outcome: Progressing  1/21/2024 0147 by Cy Saba RN  Outcome: Progressing     Problem: Depression  Goal: Treatment Goal: Demonstrate behavioral control of depressive symptoms, verbalize feelings of improved mood/affect, and adopt new coping skills prior to discharge  Outcome: Progressing  Goal: Refrain from harming self  Description: Interventions:  - Monitor patient closely, per order   - Supervise medication ingestion, monitor effects and side effects   Outcome: Progressing  Goal: Refrain from isolation  Description: Interventions:  - Develop a trusting relationship   - Encourage socialization   Outcome: Progressing  Goal: Refrain from self-neglect  Outcome: Progressing     Problem: Anxiety  Goal: Anxiety is at manageable level  Description: Interventions:  - Assess and monitor patient's anxiety level.   - Monitor for signs and symptoms (heart palpitations, chest pain, shortness of breath, headaches, nausea, feeling jumpy, restlessness, irritable, apprehensive).   - Collaborate with interdisciplinary team and initiate plan and interventions as ordered.  - [place] patient to unit/surroundings  - Explain treatment plan  - Encourage participation in care  - Encourage verbalization of concerns/fears  - Identify coping mechanisms  - Assist in developing anxiety-reducing skills  -  Administer/offer alternative therapies  - Limit or eliminate stimulants  Outcome: Progressing     Problem: Alteration in Orientation  Goal: Treatment Goal: Demonstrate a reduction of confusion and improved orientation to person, place, time and/or situation upon discharge, according to optimum baseline/ability  Outcome: Progressing  Goal: Interact with staff daily  Description: Interventions:  - Assess and re-assess patient's level of orientation  - Engage patient in 1 on 1 interactions, daily, for a minimum of 15 minutes   - Establish rapport/trust with patient   Outcome: Progressing  Goal: Allow medical examinations, as recommended  Description: Interventions:  - Provide physical/neurological exams and/or referrals, per provider   Outcome: Progressing  Goal: Cooperate with recommended testing/procedures  Description: Interventions:  - Determine need for ancillary testing  - Observe for mental status changes  - Implement falls/precaution protocol   Outcome: Progressing

## 2024-01-21 NOTE — NURSING NOTE
Patient is calm and cooperative with care. Denies all psych s/s. Medication compliant. Patient is withdrawn to room during shift. Patient encouraged to make needs known. Safety checks ongoing.

## 2024-01-21 NOTE — PROGRESS NOTES
Progress Note - Behavioral Health   Olga Byrd 53 y.o. female MRN: [id number]  Unit/Bed#: [facility] 607-02 Encounter: [id number]    Assessment/Plan   Principal Problem:    Schizoaffective disorder (HCC)  Active Problems:    Medical clearance for psychiatric admission    Positive QuantiFERON-TB Gold test    Patient was seen for continuing care and treatment.  Case was discussed with the nursing staff.  On examination today, the patient is seen lying on her bed in her room.  She is very pleasant and cooperative on interview.  She denies any symptoms currently.  Behavior over the last 24 hours has been unchanged.  No new clinical issues or concerns were expressed by patient or staff over the last 24 hours.  She is sleeping well and eating well with no complaints of any medication side effects and no new medical concerns.  She is tentatively scheduled for discharge either tomorrow or Tuesday.        Mental Status Evaluation:  Appearance:  age appropriate and casually dressed   Behavior:  Pleasant and cooperative   Speech:  normal pitch and normal volume   Mood:  euthymic   Affect:  mood-congruent   Thought Process:  circumstantial   Associations: circumstantial associations   Thought Content:  No overt delusions   Perceptual Disturbances: None   Risk Potential: Suicidal Ideations none  Homicidal Ideations none  Potential for Aggression No   Sensorium:  person, place, and time/date   Memory:  recent and remote memory grossly intact   Consciousness:  alert and awake    Attention: attention span and concentration were age appropriate   Insight:  limited   Judgment: limited   Gait/Station: normal gait/station   Motor Activity: no abnormal movements     Progress Toward Goals: Slow and steady improvement of mood.  Patient is scheduled for discharge either tomorrow or Tuesday    Recommended Treatment: Continue with group therapy, milieu therapy and occupational therapy.      Risks, benefits and possible side effects of  Medications:   Risks, benefits, and possible side effects of medications explained to patient and patient verbalizes understanding.      Medications:  Risperdal M-Tab 3 mg twice daily .    Labs: I have personally reviewed all pertinent laboratory/tests results. Most Recent Labs:   Lab Results   Component Value Date    WBC 7.95 01/05/2024    RBC 5.59 (H) 01/05/2024    HGB 15.7 (H) 01/05/2024    HCT 48.8 (H) 01/05/2024     01/05/2024    RDW 14.3 01/05/2024    NEUTROABS 5.47 01/05/2024    SODIUM 140 01/05/2024    K 3.5 01/05/2024     01/05/2024    CO2 28 01/05/2024    BUN 13 01/05/2024    CREATININE 0.89 01/05/2024    GLUC 89 01/05/2024    GLUF 89 01/05/2024    CALCIUM 9.7 01/05/2024    AST 22 01/04/2024    ALT 14 01/04/2024    ALKPHOS 67 01/04/2024    TP 7.2 01/04/2024    ALB 4.2 01/04/2024    TBILI 0.35 01/04/2024    CHOLESTEROL 198 01/05/2024    HDL 81 01/05/2024    TRIG 47 01/05/2024    LDLCALC 108 (H) 01/05/2024    NONHDLC 117 01/05/2024    QCE5FGMDFGHJ 5.929 (H) 01/04/2024       Counseling / Coordination of Care  Total floor / unit time spent today 30 minutes. Greater than 50% of total time was spent with the patient and / or family counseling and / or coordination of care. A description of the counseling / coordination of care: Medication management, treatment and chart review

## 2024-01-22 VITALS
TEMPERATURE: 97.7 F | OXYGEN SATURATION: 99 % | WEIGHT: 129.2 LBS | HEART RATE: 57 BPM | BODY MASS INDEX: 23.77 KG/M2 | DIASTOLIC BLOOD PRESSURE: 72 MMHG | SYSTOLIC BLOOD PRESSURE: 114 MMHG | HEIGHT: 62 IN | RESPIRATION RATE: 18 BRPM

## 2024-01-22 PROCEDURE — 99238 HOSP IP/OBS DSCHRG MGMT 30/<: CPT | Performed by: PSYCHIATRY & NEUROLOGY

## 2024-01-22 RX ORDER — FLUTICASONE PROPIONATE 50 MCG
2 SPRAY, SUSPENSION (ML) NASAL 2 TIMES DAILY
Qty: 9.9 ML | Refills: 0 | Status: SHIPPED | OUTPATIENT
Start: 2024-01-22 | End: 2024-01-22

## 2024-01-22 RX ORDER — RISPERIDONE 3 MG/1
3 TABLET ORAL 2 TIMES DAILY
Qty: 60 TABLET | Refills: 0 | Status: SHIPPED | OUTPATIENT
Start: 2024-01-22 | End: 2024-01-22

## 2024-01-22 RX ORDER — LORATADINE 10 MG/1
10 TABLET ORAL DAILY
Qty: 30 TABLET | Refills: 0 | Status: SHIPPED | OUTPATIENT
Start: 2024-01-23 | End: 2024-02-22

## 2024-01-22 RX ORDER — RISPERIDONE 3 MG/1
3 TABLET ORAL 2 TIMES DAILY
Qty: 60 TABLET | Refills: 0 | Status: SHIPPED | OUTPATIENT
Start: 2024-01-22 | End: 2024-02-21

## 2024-01-22 RX ADMIN — RISPERIDONE 3 MG: 2 TABLET, ORALLY DISINTEGRATING ORAL at 08:29

## 2024-01-22 NOTE — PROGRESS NOTES
01/22/24 1015 01/22/24 1100   Activity/Group Checklist   Group Community meeting Exercise   Attendance Attended Did not attend   Attendance Duration (min) 16-30  --    Interactions Interacted appropriately  (Pt. gave advice to peers to remain calm when talking to your Dr.)  --    Affect/Mood Appropriate;Calm;Normal range  --    Goals Achieved Able to listen to others;Discussed coping strategies;Identified resources and support systems;Identified feelings  --

## 2024-01-22 NOTE — NURSING NOTE
AVS reviewed. Patient verbalized understanding. Ambulated to lobby where mother was waiting for her. D/C to residential home.

## 2024-01-22 NOTE — PROGRESS NOTES
01/22/24 0859   Team Meeting   Meeting Type Daily Rounds   Initial Conference Date 01/22/24   Team Members Present   Team Members Present Physician;Nurse;;;Occupational Therapist   Physician Team Member Patricia   Nursing Team Member Richa   Care Management Team Member Hans   Social Work Team Member Anabell ALAN Team Member Sudheer   Other (Discipline and Name) Kassandra - Pharmacy   Patient/Family Present   Patient Present No   Patient's Family Present No     Calm and cooperative. Discharge for today at 12.

## 2024-01-22 NOTE — NURSING NOTE
Patient is bright on approach. Visible and social. Appetite and hygiene is adequate. Medication compliant. Denies psych symptoms. Excited for D/C.

## 2024-01-22 NOTE — NURSING NOTE
Patient is pleasant and cooperative with care. Denies all psych s/s. Medication compliant. Patient is isolative to self, seen briefly utilizing the patient's phone during shift. Patient denies any needs at this time. Safety checks ongoing.

## 2024-01-22 NOTE — PLAN OF CARE
Problem: Alteration in Thoughts and Perception  Goal: Treatment Goal: Gain control of psychotic behaviors/thinking, reduce/eliminate presenting symptoms and demonstrate improved reality functioning upon discharge  Outcome: Progressing  Goal: Verbalize thoughts and feelings  Description: Interventions:  - Promote a nonjudgmental and trusting relationship with the patient through active listening and therapeutic communication  - Assess patient's level of functioning, behavior and potential for risk  - Engage patient in 1 on 1 interactions  - Encourage patient to express fears, feelings, frustrations, and discuss symptoms    - [place] patient to reality, help patient recognize reality-based thinking   - Administer medications as ordered and assess for potential side effects  - Provide the patient education related to the signs and symptoms of the illness and desired effects of prescribed medications  Outcome: Progressing  Goal: Refrain from acting on delusional thinking/internal stimuli  Description: Interventions:  - Monitor patient closely, per order   - Utilize least restrictive measures   - Set reasonable limits, give positive feedback for acceptable   - Administer medications as ordered and monitor of potential side effects  Outcome: Progressing  Goal: Agree to be compliant with medication regime, as prescribed and report medication side effects  Description: Interventions:  - Offer appropriate PRN medication and supervise ingestion; conduct AIMS, as needed   Outcome: Progressing  Goal: Recognize dysfunctional thoughts, communicate reality-based thoughts at the time of discharge  Description: Interventions:  - Provide medication and psycho-education to assist patient in compliance and developing insight into his/her illness   Outcome: Progressing  Goal: Complete daily ADLs, including personal hygiene independently, as able  Description: Interventions:  - Observe, teach, and assist patient with ADLS  - Monitor and  promote a balance of rest/activity, with adequate nutrition and elimination   Outcome: Progressing     Problem: Depression  Goal: Treatment Goal: Demonstrate behavioral control of depressive symptoms, verbalize feelings of improved mood/affect, and adopt new coping skills prior to discharge  Outcome: Progressing  Goal: Refrain from harming self  Description: Interventions:  - Monitor patient closely, per order   - Supervise medication ingestion, monitor effects and side effects   Outcome: Progressing  Goal: Refrain from isolation  Description: Interventions:  - Develop a trusting relationship   - Encourage socialization   Outcome: Progressing  Goal: Refrain from self-neglect  Outcome: Progressing     Problem: Anxiety  Goal: Anxiety is at manageable level  Description: Interventions:  - Assess and monitor patient's anxiety level.   - Monitor for signs and symptoms (heart palpitations, chest pain, shortness of breath, headaches, nausea, feeling jumpy, restlessness, irritable, apprehensive).   - Collaborate with interdisciplinary team and initiate plan and interventions as ordered.  - [place] patient to unit/surroundings  - Explain treatment plan  - Encourage participation in care  - Encourage verbalization of concerns/fears  - Identify coping mechanisms  - Assist in developing anxiety-reducing skills  - Administer/offer alternative therapies  - Limit or eliminate stimulants  Outcome: Progressing     Problem: Alteration in Orientation  Goal: Treatment Goal: Demonstrate a reduction of confusion and improved orientation to person, place, time and/or situation upon discharge, according to optimum baseline/ability  Outcome: Progressing  Goal: Interact with staff daily  Description: Interventions:  - Assess and re-assess patient's level of orientation  - Engage patient in 1 on 1 interactions, daily, for a minimum of 15 minutes   - Establish rapport/trust with patient   Outcome: Progressing  Goal: Allow medical examinations,  as recommended  Description: Interventions:  - Provide physical/neurological exams and/or referrals, per provider   Outcome: Progressing  Goal: Cooperate with recommended testing/procedures  Description: Interventions:  - Determine need for ancillary testing  - Observe for mental status changes  - Implement falls/precaution protocol   Outcome: Progressing

## 2024-01-22 NOTE — PLAN OF CARE
Problem: Alteration in Thoughts and Perception  Goal: Treatment Goal: Gain control of psychotic behaviors/thinking, reduce/eliminate presenting symptoms and demonstrate improved reality functioning upon discharge  Outcome: Completed  Goal: Verbalize thoughts and feelings  Description: Interventions:  - Promote a nonjudgmental and trusting relationship with the patient through active listening and therapeutic communication  - Assess patient's level of functioning, behavior and potential for risk  - Engage patient in 1 on 1 interactions  - Encourage patient to express fears, feelings, frustrations, and discuss symptoms    - [place] patient to reality, help patient recognize reality-based thinking   - Administer medications as ordered and assess for potential side effects  - Provide the patient education related to the signs and symptoms of the illness and desired effects of prescribed medications  Outcome: Completed  Goal: Refrain from acting on delusional thinking/internal stimuli  Description: Interventions:  - Monitor patient closely, per order   - Utilize least restrictive measures   - Set reasonable limits, give positive feedback for acceptable   - Administer medications as ordered and monitor of potential side effects  Outcome: Completed  Goal: Agree to be compliant with medication regime, as prescribed and report medication side effects  Description: Interventions:  - Offer appropriate PRN medication and supervise ingestion; conduct AIMS, as needed   Outcome: Completed  Goal: Attend and participate in unit activities, including therapeutic, recreational, and educational groups  Description: Interventions:  -Encourage Visitation and family involvement in care  Outcome: Completed  Goal: Recognize dysfunctional thoughts, communicate reality-based thoughts at the time of discharge  Description: Interventions:  - Provide medication and psycho-education to assist patient in compliance and developing insight into  his/her illness   Outcome: Completed  Goal: Complete daily ADLs, including personal hygiene independently, as able  Description: Interventions:  - Observe, teach, and assist patient with ADLS  - Monitor and promote a balance of rest/activity, with adequate nutrition and elimination   Outcome: Completed     Problem: Ineffective Coping  Goal: Identifies ineffective coping skills  Outcome: Completed  Goal: Identifies healthy coping skills  Outcome: Completed  Goal: Demonstrates healthy coping skills  Outcome: Completed  Goal: Participates in unit activities  Description: Interventions:  - Provide therapeutic environment   - Provide required programming   - Redirect inappropriate behaviors   Outcome: Completed  Goal: Patient/Family participate in treatment and DC plans  Description: Interventions:  - Provide therapeutic environment  Outcome: Completed  Goal: Patient/Family verbalizes awareness of resources  Outcome: Completed  Goal: Understands least restrictive measures  Description: Interventions:  - Utilize least restrictive behavior  Outcome: Completed  Goal: Free from restraint events  Description: - Utilize least restrictive measures   - Provide behavioral interventions   - Redirect inappropriate behaviors   Outcome: Completed     Problem: Anxiety  Goal: Anxiety is at manageable level  Description: Interventions:  - Assess and monitor patient's anxiety level.   - Monitor for signs and symptoms (heart palpitations, chest pain, shortness of breath, headaches, nausea, feeling jumpy, restlessness, irritable, apprehensive).   - Collaborate with interdisciplinary team and initiate plan and interventions as ordered.  - [place] patient to unit/surroundings  - Explain treatment plan  - Encourage participation in care  - Encourage verbalization of concerns/fears  - Identify coping mechanisms  - Assist in developing anxiety-reducing skills  - Administer/offer alternative therapies  - Limit or eliminate stimulants  Outcome:  Completed     Problem: Alteration in Orientation  Goal: Treatment Goal: Demonstrate a reduction of confusion and improved orientation to person, place, time and/or situation upon discharge, according to optimum baseline/ability  Outcome: Completed  Goal: Interact with staff daily  Description: Interventions:  - Assess and re-assess patient's level of orientation  - Engage patient in 1 on 1 interactions, daily, for a minimum of 15 minutes   - Establish rapport/trust with patient   Outcome: Completed  Goal: Allow medical examinations, as recommended  Description: Interventions:  - Provide physical/neurological exams and/or referrals, per provider   Outcome: Completed  Goal: Cooperate with recommended testing/procedures  Description: Interventions:  - Determine need for ancillary testing  - Observe for mental status changes  - Implement falls/precaution protocol   Outcome: Completed     Problem: DISCHARGE PLANNING - CARE MANAGEMENT  Goal: Discharge to post-acute care or home with appropriate resources  Description: INTERVENTIONS:  - Conduct assessment to determine patient/family and health care team treatment goals, and need for post-acute services based on payer coverage, community resources, and patient preferences, and barriers to discharge  - Address psychosocial, clinical, and financial barriers to discharge as identified in assessment in conjunction with the patient/family and health care team  - Arrange appropriate level of post-acute services according to patient’s   needs and preference and payer coverage in collaboration with the physician and health care team  - Communicate with and update the patient/family, physician, and health care team regarding progress on the discharge plan  - Arrange appropriate transportation to post-acute venues  Outcome: Completed

## 2024-01-22 NOTE — DISCHARGE SUMMARY
Discharge Summary - Behavioral Health   Olga Byrd 53 y.o. female MRN: [id number]  Unit/Bed#: [facility] 607-02 Encounter: [id number]     Admission Date:   Admission Orders (From admission, onward)       Ordered        01/04/24 1954  ED TO SAME CAMPUS [facility] UNIT (using Admission Navigator) - Admit Patient to IP Behavioral Health Unit  Once                                Discharge Date: 01/22/24     Attending Psychiatrist: Dr. Andrés Matthews    Admission Diagnosis:    Principal Problem:    Schizoaffective disorder (HCC)  Active Problems:    Medical clearance for psychiatric admission    Positive QuantiFERON-TB Gold test      Discharge Diagnosis:     Principal Problem:    Schizoaffective disorder (HCC)  Active Problems:    Medical clearance for psychiatric admission    Positive QuantiFERON-TB Gold test  Resolved Problems:    * No resolved hospital problems. *      Reason for Admission/HPI:   Olga is a 53 y.o.  female, with past psychiatric history of schizoaffective disorder, who initially presented to the U at [address] with Signs of acute psychosis. Olga had poor understanding of events leading to admission and initially reported her outpatient doctors had stopped her psychiatric medications, which was later determined to be incorrect based on documentation of conversation with OP psychiatrist and . Olga was admitted to the psychiatric unit on a involuntarily 302 commitment basis and subsequent 302 commitment.     History reviewed. No pertinent past medical history.  History reviewed. No pertinent surgical history.    Medications:    All current active medications have been reviewed.    Allergies:     No Known Allergies    Please refer to the initial H&P for full details.      Vital signs in last 24 hours:    Temp:  [97.7 °F (36.5 °C)] 97.7 °F (36.5 °C)  HR:  [57-76] 57  Resp:  [15-18] 18  BP: (114-130)/(72-77) 114/72      Intake/Output Summary (Last 24 hours) at 1/22/2024 1757  Last  data filed at 1/22/2024 1217  Gross per 24 hour   Intake 660 ml   Output --   Net 660 ml         Hospital Course:   On admission, Olga was admitted to the inpatient psychiatric unit and started on Behavioral Health checks every 15 minutes. During the hospitalization she was encouraged to attend individual therapy, group therapy, milieu therapy and occupational therapy.  Upon admission Olga was seen by medical service for medical clearance for inpatient treatment.    Olga was started on Risperdal M-tab. Olga's medications were titrated as appropriate until discharge, including:    Risperdal 3 mg po BID for psychotic symptoms     Prior to beginning of treatment medications risks and benefits and possible side effects were reviewed with Olga. Olga verbalized understanding and agreement for treatment.  Olga tolerated these medications with no acute side effects. The patient's mood brightened over the course of treatment, and she was seen in [facility] interacting appropriately with peers. Olga did not demonstrate dangerous behavior to self or others during her inpatient stay.     On the day of discharge, Olga denied suicidal ideation, intent or plan at the time of discharge and denied homicidal ideation, intent or plan at the time of discharge. There was no overt psychosis at the time of discharge. she was participating appropriately in milieu at the time of discharge. Olga was tolerating medications and was not reporting any significant side effects at the time of discharge.     We felt that Olga achieved the maximum benefit of inpatient stay at that point and could now follow up with outpatient treatment. Prior to discharge  spoke with Olga's mother to address support and Olga's readiness for discharge. Olga's mother felt comfortable with her discharge. The outpatient follow up with therapist Anthony Kam on 01/23 and psychiatrist Dr. Rojas on 01/24  was arranged by the unit   upon discharge.    I reviewed with Olga the importance of compliance with medications and outpatient treatment after discharge. I discussed the medication regimen and possible side effects of the medications with Olga prior to discharge. At the time of discharge she was tolerating psychiatric medications., I discussed outpatient follow up with Olga., and I reviewed with Olga crisis plan and safety plan upon discharge. The patient understands the importance of taking their medications and attending their outpatient appointments. The patient knows that if there are concerns for safety to utilize their coping skills and can call the suicide hotline as well as 911 if there are concerns for safety.      Behavioral Health Medications: all current active meds have been reviewed.  Discharge on Two Antipsychotic Medications : No    Labs/Imaging:   I have personally reviewed all pertinent laboratory/tests results.  Most Recent Labs:   Lab Results   Component Value Date    WBC 7.95 01/05/2024    RBC 5.59 (H) 01/05/2024    HGB 15.7 (H) 01/05/2024    HCT 48.8 (H) 01/05/2024     01/05/2024    RDW 14.3 01/05/2024    NEUTROABS 5.47 01/05/2024    SODIUM 140 01/05/2024    K 3.5 01/05/2024     01/05/2024    CO2 28 01/05/2024    BUN 13 01/05/2024    CREATININE 0.89 01/05/2024    GLUC 89 01/05/2024    GLUF 89 01/05/2024    CALCIUM 9.7 01/05/2024    AST 22 01/04/2024    ALT 14 01/04/2024    ALKPHOS 67 01/04/2024    TP 7.2 01/04/2024    ALB 4.2 01/04/2024    TBILI 0.35 01/04/2024    CHOLESTEROL 198 01/05/2024    HDL 81 01/05/2024    TRIG 47 01/05/2024    LDLCALC 108 (H) 01/05/2024    NONHDLC 117 01/05/2024    [id number] 5.929 (H) 01/04/2024     Admission Labs:   Admission on 01/04/2024, Discharged on 01/22/2024   Component Date Value    Amph/Meth UR 01/04/2024 Negative     Barbiturate Ur 01/04/2024 Negative     Benzodiazepine Urine 01/04/2024 Negative     Cocaine Urine 01/04/2024 Negative     Methadone Urine 01/04/2024  Negative     Opiate Urine 01/04/2024 Positive (A)     PCP Ur 01/04/2024 Negative     THC Urine 01/04/2024 Negative     Oxycodone Urine 01/04/2024 Negative     EXTBreath Alcohol 01/04/2024 0.0     WBC 01/04/2024 8.32     RBC 01/04/2024 5.40 (H)     Hemoglobin 01/04/2024 14.8     Hematocrit 01/04/2024 46.8 (H)     MCV 01/04/2024 87     MCH 01/04/2024 27.4     MCHC 01/04/2024 31.6     RDW 01/04/2024 14.3     MPV 01/04/2024 9.9     Platelets 01/04/2024 265     nRBC 01/04/2024 0     Neutrophils Relative 01/04/2024 83 (H)     Immat GRANS % 01/04/2024 0     Lymphocytes Relative 01/04/2024 11 (L)     Monocytes Relative 01/04/2024 6     Eosinophils Relative 01/04/2024 0     Basophils Relative 01/04/2024 0     Neutrophils Absolute 01/04/2024 6.83     Immature Grans Absolute 01/04/2024 0.02     Lymphocytes Absolute 01/04/2024 0.92     Monocytes Absolute 01/04/2024 0.52     Eosinophils Absolute 01/04/2024 0.01     Basophils Absolute 01/04/2024 0.02     Sodium 01/04/2024 138     Potassium 01/04/2024 3.8     Chloride 01/04/2024 104     CO2 01/04/2024 27     ANION GAP 01/04/2024 7     BUN 01/04/2024 11     Creatinine 01/04/2024 0.74     Glucose 01/04/2024 98     Calcium 01/04/2024 9.5     AST 01/04/2024 22     ALT 01/04/2024 14     Alkaline Phosphatase 01/04/2024 67     Total Protein 01/04/2024 7.2     Albumin 01/04/2024 4.2     Total Bilirubin 01/04/2024 0.35     eGFR 01/04/2024 93     Color, UA 01/04/2024 Straw     Clarity, UA 01/04/2024 Slightly Cloudy (A)     Specific [place], UA 01/04/2024 1.025     pH, UA 01/04/2024 6.0     Leukocytes, UA 01/04/2024 25.0 (A)     Nitrite, UA 01/04/2024 Negative     Protein, UA 01/04/2024 15 (Trace) (A)     Glucose, UA 01/04/2024 Negative     Ketones, UA 01/04/2024 Negative     Bilirubin, UA 01/04/2024 Negative     Occult Blood, UA 01/04/2024 Negative     UROBILINOGEN UA 01/04/2024 Negative     TSH 3RD GENERATON 01/04/2024 5.929 (H)     RBC, UA 01/04/2024 None Seen     WBC, UA 01/04/2024 1-2      Epithelial Cells 01/04/2024 None Seen     Bacteria, UA 01/04/2024 None Seen     Ventricular Rate 01/04/2024 88     Atrial Rate 01/04/2024 88     NC Interval 01/04/2024 136     QRSD Interval 01/04/2024 76     QT Interval 01/04/2024 378     QTC Interval 01/04/2024 457     P Axis 01/04/2024 61     QRS [place] 01/04/2024 37     T Wave Axis 01/04/2024 7     Ventricular Rate 01/04/2024 88     Atrial Rate 01/04/2024 88     NC Interval 01/04/2024 136     QRSD Interval 01/04/2024 76     QT Interval 01/04/2024 370     QTC Interval 01/04/2024 447     P Axis 01/04/2024 60     QRS Axis 01/04/2024 33     T Wave [place] 01/04/2024 -2     Sodium 01/05/2024 140     Potassium 01/05/2024 3.5     Chloride 01/05/2024 101     CO2 01/05/2024 28     ANION GAP 01/05/2024 11     BUN 01/05/2024 13     Creatinine 01/05/2024 0.89     Glucose 01/05/2024 89     Glucose, Fasting 01/05/2024 89     Calcium 01/05/2024 9.7     eGFR 01/05/2024 74     WBC 01/05/2024 7.95     RBC 01/05/2024 5.59 (H)     Hemoglobin 01/05/2024 15.7 (H)     Hematocrit 01/05/2024 48.8 (H)     MCV 01/05/2024 87     MCH 01/05/2024 28.1     MCHC 01/05/2024 32.2     RDW 01/05/2024 14.3     MPV 01/05/2024 10.3     Platelets 01/05/2024 288     nRBC 01/05/2024 0     Neutrophils Relative 01/05/2024 68     Immat GRANS % 01/05/2024 0     Lymphocytes Relative 01/05/2024 24     Monocytes Relative 01/05/2024 6     Eosinophils Relative 01/05/2024 1     Basophils Relative 01/05/2024 1     Neutrophils Absolute 01/05/2024 5.47     Immature Grans Absolute 01/05/2024 0.02     Lymphocytes Absolute 01/05/2024 1.87     Monocytes Absolute 01/05/2024 0.50     Eosinophils Absolute 01/05/2024 0.05     Basophils Absolute 01/05/2024 0.04     Vitamin B-12 01/05/2024 723     Folate 01/05/2024 >22.3     Vit D, 25-Hydroxy 01/05/2024 40.5     Cholesterol 01/05/2024 198     Triglycerides 01/05/2024 47     HDL, Direct 01/05/2024 81     LDL Calculated 01/05/2024 108 (H)     Non-HDL-Chol (CHOL-HDL) 01/05/2024 117   "    Drug Screen:   Lab Results   Component Value Date    AMPMETHUR Negative 01/04/2024    BARBTUR Negative 01/04/2024    BDZUR Negative 01/04/2024    THCUR Negative 01/04/2024    COCAINEUR Negative 01/04/2024    METHADONEUR Negative 01/04/2024    OPIATEUR Positive (A) 01/04/2024    PCPUR Negative 01/04/2024       Mental Status at time of Discharge:   Appearance:  age appropriate, dressed appropriately, looks stated age, dressed in casual clothing, adequate hygiene and grooming, cooperative with interview, good eye contact, NAD   Behavior:  cooperative   Speech:  normal rate, normal volume, normal pitch, fluent, clear, and coherent   Mood:  \"Happy, calm\"   Affect:  mood-congruent and approaching normal range and intensity   Language Within normal limits   Thought Process:  goal directed, linear, normal rate of thoughts   Associations: intact associations      Thought Content:  No verbalized delusions   Perceptual Disturbances: Denies auditory or visual hallucinations and Does not appear to be responding to internal stimuli   Risk Potential: Denies suicidal or homicidal ideation, plan, or intent   Sensorium:  person, place, time, and current situation   Cognition:  Grossly intact   Consciousness:  alert and awake   Attention: attention span and concentration were age appropriate   Insight:  improving   Judgment: improving   Intellect appears to be of average intelligence   Gait/Station: normal gait/station   Motor Activity: no abnormal movements     Suicide/Homicide Risk Assessment:  Risk of Harm to Self:   The following ratings are based on assessment at the time of discharge, review of the hospital stay progress, and assessment at the time of the interview  Demographic risk factors include: age: over 50 or older  Historical Risk Factors include: history of psychosis, substance use, history of violence  Current Specific Risk Factors include: recent inpatient psychiatric admission - being discharged today, mental " illness diagnosis, ongoing psychotic symptoms  Protective Factors: no current suicidal ideation, no current depressive symptoms, improved psychotic symptoms, ability to manage anger well, ability to make plans for the future, no current suicidal plan or intent, outpatient psychiatric follow up established, family support established, being a parent, compliant with mental health treatment, responsibilities and duties to others, restricted access to lethal means, safe and stable living environment  Weapons/Firearms: none. The following steps have been taken to ensure weapons are properly secured: not applicable  Based on today's assessment, Olga presents the following risk of harm to self: low    Risk of Harm to Others:  The following ratings are based on assessment at the time of discharge, review of the hospital stay progress, and assessment at the time of the interview  Demographic Risk Factors include: unemployed.  Historical Risk Factors include: history of violence.  Current Specific Risk Factors include: current psychotic symptoms  Protective Factors: no current homicidal ideation, compliant with medications, willing to continue psychiatric treatment, outpatient follow up established, stable living environment, supportive mother, responsibilities and duties to others, being a parent, connection to own children, restricted access to lethal means, access to mental health treatment  Weapons/Firearms: none. The following steps have been taken to ensure weapons are properly secured: not applicable  Based on today's assessment, Olga presents the following risk of harm to others: low    The following interventions are recommended: outpatient follow up with a psychiatrist, outpatient follow up with a therapist    Discharge Medications:  See list above, as well as the after visit summary for all reconciled discharge medications provided to patient and family.      Discharge instructions/Information to patient and  family:   See after visit summary for information provided to patient and family.      Provisions for Follow-Up Care:  See after visit summary for information related to follow-up care and any pertinent home health orders.        Taryn Flowers DO 01/22/24  Psychiatry Resident, PGY-II    This note was completed in part utilizing Dragon dictation Software. Grammatical, translation, syntax errors, random word insertions, spelling mistakes, and incomplete sentences may be an occasional consequence of this system secondary to software limitations with voice recognition, ambient noise, and hardware issues. If you have any questions or concerns about the content, text, or information contained within the body of this dictation, please contact the provider for clarification.

## 2024-01-22 NOTE — BH TRANSITION RECORD
Contact Information: If you have any questions, concerns, pended studies, tests and/or procedures, or emergencies regarding your inpatient behavioral health visit. Please contact [facility] 6T [phone] and ask to speak to a , nurse or physician. A contact is available 24 hours/ 7 days a week at this number.     Summary of Procedures Performed During your Stay:  Below is a list of major procedures performed during your hospital stay and a summary of results:  - Cardiac Procedures/Studies: EKG on 01/04/24: NSR, nonspecific T wave changes .    Pending Studies (From admission, onward)      None          Please follow up on the above pending studies with your PCP and/or referring provider.